# Patient Record
Sex: FEMALE | Race: WHITE | NOT HISPANIC OR LATINO | Employment: FULL TIME | URBAN - METROPOLITAN AREA
[De-identification: names, ages, dates, MRNs, and addresses within clinical notes are randomized per-mention and may not be internally consistent; named-entity substitution may affect disease eponyms.]

---
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
## 2023-10-21 ENCOUNTER — HOSPITAL ENCOUNTER (EMERGENCY)
Facility: HOSPITAL | Age: 18
Discharge: HOME | End: 2023-10-21
Attending: EMERGENCY MEDICINE
Payer: COMMERCIAL

## 2023-10-21 ENCOUNTER — APPOINTMENT (OUTPATIENT)
Dept: CT IMAGING | Facility: HOSPITAL | Age: 18
End: 2023-10-21
Payer: COMMERCIAL

## 2023-10-21 VITALS
HEIGHT: 63 IN | SYSTOLIC BLOOD PRESSURE: 115 MMHG | RESPIRATION RATE: 16 BRPM | HEART RATE: 90 BPM | BODY MASS INDEX: 33.66 KG/M2 | WEIGHT: 190 LBS | DIASTOLIC BLOOD PRESSURE: 67 MMHG | TEMPERATURE: 98.3 F | OXYGEN SATURATION: 98 %

## 2023-10-21 DIAGNOSIS — R10.84 GENERALIZED ABDOMINAL PAIN: Primary | ICD-10-CM

## 2023-10-21 DIAGNOSIS — N93.8 DYSFUNCTIONAL UTERINE BLEEDING: ICD-10-CM

## 2023-10-21 LAB
ALBUMIN SERPL-MCNC: 4 G/DL (ref 3.5–5.2)
ALBUMIN/GLOB SERPL: 1.3 G/DL
ALP SERPL-CCNC: 76 U/L (ref 39–117)
ALT SERPL W P-5'-P-CCNC: 12 U/L (ref 1–33)
ANION GAP SERPL CALCULATED.3IONS-SCNC: 10 MMOL/L (ref 5–15)
AST SERPL-CCNC: 19 U/L (ref 1–32)
B-HCG UR QL: NEGATIVE
BASOPHILS # BLD AUTO: 0.02 10*3/MM3 (ref 0–0.2)
BASOPHILS NFR BLD AUTO: 0.5 % (ref 0–1.5)
BILIRUB SERPL-MCNC: 0.2 MG/DL (ref 0–1.2)
BILIRUB UR QL STRIP: NEGATIVE
BUN SERPL-MCNC: 9 MG/DL (ref 6–20)
BUN/CREAT SERPL: 12.5 (ref 7–25)
CALCIUM SPEC-SCNC: 9.2 MG/DL (ref 8.6–10.5)
CHLORIDE SERPL-SCNC: 106 MMOL/L (ref 98–107)
CLARITY UR: CLEAR
CO2 SERPL-SCNC: 23 MMOL/L (ref 22–29)
COLOR UR: YELLOW
CREAT SERPL-MCNC: 0.72 MG/DL (ref 0.57–1)
DEPRECATED RDW RBC AUTO: 39.8 FL (ref 37–54)
EGFRCR SERPLBLD CKD-EPI 2021: 122.9 ML/MIN/1.73
EOSINOPHIL # BLD AUTO: 0.04 10*3/MM3 (ref 0–0.4)
EOSINOPHIL NFR BLD AUTO: 1 % (ref 0.3–6.2)
ERYTHROCYTE [DISTWIDTH] IN BLOOD BY AUTOMATED COUNT: 11.9 % (ref 12.3–15.4)
EXPIRATION DATE: NORMAL
GLOBULIN UR ELPH-MCNC: 3.1 GM/DL
GLUCOSE SERPL-MCNC: 94 MG/DL (ref 65–99)
GLUCOSE UR STRIP-MCNC: NEGATIVE MG/DL
HCT VFR BLD AUTO: 38.8 % (ref 34–46.6)
HGB BLD-MCNC: 13.1 G/DL (ref 12–15.9)
HGB UR QL STRIP.AUTO: NEGATIVE
IMM GRANULOCYTES # BLD AUTO: 0.01 10*3/MM3 (ref 0–0.05)
IMM GRANULOCYTES NFR BLD AUTO: 0.2 % (ref 0–0.5)
INTERNAL NEGATIVE CONTROL: NORMAL
INTERNAL POSITIVE CONTROL: NORMAL
KETONES UR QL STRIP: NEGATIVE
LEUKOCYTE ESTERASE UR QL STRIP.AUTO: NEGATIVE
LYMPHOCYTES # BLD AUTO: 1.36 10*3/MM3 (ref 0.7–3.1)
LYMPHOCYTES NFR BLD AUTO: 32.4 % (ref 19.6–45.3)
Lab: NORMAL
MCH RBC QN AUTO: 30.6 PG (ref 26.6–33)
MCHC RBC AUTO-ENTMCNC: 33.8 G/DL (ref 31.5–35.7)
MCV RBC AUTO: 90.7 FL (ref 79–97)
MONOCYTES # BLD AUTO: 0.84 10*3/MM3 (ref 0.1–0.9)
MONOCYTES NFR BLD AUTO: 20 % (ref 5–12)
NEUTROPHILS NFR BLD AUTO: 1.93 10*3/MM3 (ref 1.7–7)
NEUTROPHILS NFR BLD AUTO: 45.9 % (ref 42.7–76)
NITRITE UR QL STRIP: NEGATIVE
NRBC BLD AUTO-RTO: 0 /100 WBC (ref 0–0.2)
PH UR STRIP.AUTO: 5.5 [PH] (ref 5–8)
PLATELET # BLD AUTO: 255 10*3/MM3 (ref 140–450)
PMV BLD AUTO: 9.9 FL (ref 6–12)
POTASSIUM SERPL-SCNC: 4.1 MMOL/L (ref 3.5–5.2)
PROT SERPL-MCNC: 7.1 G/DL (ref 6–8.5)
PROT UR QL STRIP: NEGATIVE
RBC # BLD AUTO: 4.28 10*6/MM3 (ref 3.77–5.28)
SODIUM SERPL-SCNC: 139 MMOL/L (ref 136–145)
SP GR UR STRIP: 1.03 (ref 1–1.03)
UROBILINOGEN UR QL STRIP: NORMAL
WBC NRBC COR # BLD: 4.2 10*3/MM3 (ref 3.4–10.8)

## 2023-10-21 PROCEDURE — 99284 EMERGENCY DEPT VISIT MOD MDM: CPT

## 2023-10-21 PROCEDURE — 85025 COMPLETE CBC W/AUTO DIFF WBC: CPT | Performed by: EMERGENCY MEDICINE

## 2023-10-21 PROCEDURE — 81025 URINE PREGNANCY TEST: CPT | Performed by: EMERGENCY MEDICINE

## 2023-10-21 PROCEDURE — 25810000003 SODIUM CHLORIDE 0.9 % SOLUTION: Performed by: EMERGENCY MEDICINE

## 2023-10-21 PROCEDURE — 96374 THER/PROPH/DIAG INJ IV PUSH: CPT

## 2023-10-21 PROCEDURE — 25010000002 KETOROLAC TROMETHAMINE PER 15 MG: Performed by: EMERGENCY MEDICINE

## 2023-10-21 PROCEDURE — 74176 CT ABD & PELVIS W/O CONTRAST: CPT

## 2023-10-21 PROCEDURE — 81003 URINALYSIS AUTO W/O SCOPE: CPT | Performed by: EMERGENCY MEDICINE

## 2023-10-21 PROCEDURE — 80053 COMPREHEN METABOLIC PANEL: CPT | Performed by: EMERGENCY MEDICINE

## 2023-10-21 RX ORDER — IBUPROFEN 600 MG/1
600 TABLET ORAL EVERY 8 HOURS PRN
Qty: 21 TABLET | Refills: 0 | Status: SHIPPED | OUTPATIENT
Start: 2023-10-21

## 2023-10-21 RX ORDER — KETOROLAC TROMETHAMINE 30 MG/ML
30 INJECTION, SOLUTION INTRAMUSCULAR; INTRAVENOUS ONCE
Status: COMPLETED | OUTPATIENT
Start: 2023-10-21 | End: 2023-10-21

## 2023-10-21 RX ORDER — SODIUM CHLORIDE 0.9 % (FLUSH) 0.9 %
10 SYRINGE (ML) INJECTION AS NEEDED
Status: DISCONTINUED | OUTPATIENT
Start: 2023-10-21 | End: 2023-10-21 | Stop reason: HOSPADM

## 2023-10-21 RX ADMIN — KETOROLAC TROMETHAMINE 30 MG: 30 INJECTION, SOLUTION INTRAMUSCULAR; INTRAVENOUS at 03:59

## 2023-10-21 RX ADMIN — SODIUM CHLORIDE 1000 ML: 9 INJECTION, SOLUTION INTRAVENOUS at 03:59

## 2023-10-21 NOTE — ED PROVIDER NOTES
Subjective   History of Present Illness  This is a 18-year-old female presented to the emergency department with some lower abdominal cramping and vaginal bleeding.  Patient states back in July she was evaluated for displaced IUD.  She was post to follow-up with OB/GYN, however has not done that yet.  Patient states that she came on her normal menstrual cycle.  She is concerned because she is using more tampons than normal.  Her last menstrual cycle was a month ago and was normal.  Symptoms and cramping in the lower abdomen and back.  She denies any dysuria or hematuria.  No other vaginal discharge.  There is no fevers or chills.  No headache or change in vision.  No focal weakness.  No chest pain or shortness of breath.        Review of Systems   Constitutional:  Negative for chills and fever.   HENT:  Negative for congestion, ear pain and sore throat.    Eyes:  Negative for visual disturbance.   Respiratory:  Negative for shortness of breath.    Cardiovascular:  Negative for chest pain.   Gastrointestinal:  Positive for abdominal pain.   Genitourinary:  Positive for vaginal bleeding. Negative for difficulty urinating.   Musculoskeletal:  Negative for arthralgias.   Skin:  Negative for rash.   Neurological:  Negative for dizziness, weakness and numbness.   Psychiatric/Behavioral:  Negative for agitation.        Past Medical History:   Diagnosis Date    Migraine        No Known Allergies    No past surgical history on file.    No family history on file.    Social History     Socioeconomic History    Marital status: Single   Tobacco Use    Smoking status: Never   Vaping Use    Vaping Use: Some days   Substance and Sexual Activity    Alcohol use: Defer    Drug use: Defer    Sexual activity: Never           Objective   Physical Exam  Vitals and nursing note reviewed.   Constitutional:       General: She is not in acute distress.     Appearance: She is not ill-appearing or toxic-appearing.   HENT:      Mouth/Throat:       Pharynx: No posterior oropharyngeal erythema.   Eyes:      Conjunctiva/sclera: Conjunctivae normal.      Pupils: Pupils are equal, round, and reactive to light.   Cardiovascular:      Rate and Rhythm: Normal rate and regular rhythm.   Pulmonary:      Effort: Pulmonary effort is normal. No respiratory distress.   Abdominal:      General: Abdomen is flat. There is no distension.      Palpations: There is no mass.      Tenderness: There is no abdominal tenderness. There is no guarding or rebound.   Musculoskeletal:         General: No deformity. Normal range of motion.   Skin:     General: Skin is warm.      Findings: No rash.   Neurological:      General: No focal deficit present.      Mental Status: She is alert and oriented to person, place, and time.      Motor: No weakness.         Procedures           ED Course  ED Course as of 10/21/23 0527   Sat Oct 21, 2023   0423 BP: 115/67 [JK]   0423 Temp: 98.3 °F (36.8 °C) [JK]   0423 Temp src: Oral [JK]   0423 Heart Rate: 90 [JK]   0423 Resp: 16 [JK]   0423 SpO2: 98 %  Patient's repeat vitals, telemetry tracing, and pulse oximetry tracing were directly viewed and interpreted by myself.  Patient is hemodynamically stable [JK]   0524 Comprehensive Metabolic Panel [JK]   0524 Urinalysis With Microscopic If Indicated (No Culture) - Urine, Clean Catch [JK]   0524 POC Urine Pregnancy [JK]   0524 CBC & Differential(!)  Laboratory studies were reviewed and interpreted directly by myself.  CMP normal, urinalysis normal, pregnancy was negative, CBC normal [JK]   0525 CT Abdomen Pelvis Without Contrast  Imaging was directly visualized by myself, per my interpretations, CT of the abdomen and pelvis showed IUD in appropriate place.  No other process.. [JK]   0525 On reevaluation, the patient states that they are feeling much better.  Patient tolerated by mouth challenge of juice and crackers without difficulty.  They are not complaining of any new abdominal pain.  Repeat examination  did not show any significant guarding or rebound.  No evidence of peritonitis.  No acute no tenderness.  Patient was given strict return precautions for acute abdomen.  They need to be reevaluated within 24 hours for acute abdomen by PCP or return to the emergency department for reexamination.   [JK]   1191 I had a discussion with the patient/family regarding diagnosis, diagnostic results, treatment plan, and medications. The patient/family indicated understanding of these instructions. I spent adequate time at the bedside prior to discharge necessary to discuss the aftercare instructions, giving patient education, providing explanations of the results of our evaluations/findings, and my decision making to assure that the patient/family understand the plan of care. Time was allotted to answer questions at that time and throughout the ED course. Patient is required to maintain timely follow up, as discussed. I also discussed the potential for the development of an acute emergent condition requiring further evaluation, return to the ER, admission, or even surgical intervention.  I encouraged the patient to return to the emergency department immediately for any concerns, worsening symptoms, new complaints, or if symptoms persist and they are unable to seek follow-up in a timely fashion. The patient/family expressed understanding and agreement with this plan    Shared decision making:   After full review of the patient's clinical presentation, review of any work-up including but not limited to laboratory studies and radiology obtained, I had a discussion with the patient.  Treatment options were discussed as well as the risks, benefits and consequences.  I discussed all findings with the patient and family members if available.  During the discussion, treatment goals were understood by all as well as any misconceptions which were addressed with the patient.  Ample time was given for any questions they may have had.   They are in agreement with the treatment plan as well as final disposition. [JK]      ED Course User Index  [JK] Kvng Fenton MD                                           Medical Decision Making  This is a 18-year-old female presented to the emergency department with some lower abdominal cramping and vaginal bleeding.  The patient symptoms seem consistent with her menstrual cycle.  On examination she has no reproducible tenderness.  There is no evidence of acute peritonitis or acute abdomen.  Patient is hemodynamically stable.  Afebrile.  IV asked will be established.  Provided symptomatic treatment.  Work-up initiated.      Differential diagnosis: Peptic ulcer disease, dyspepsia, GERD, pancreatitis, biliary colic, electrolyte abnormality, acute kidney injury, dysfunctional uterine bleeding, menstrual cycle      Problems Addressed:  Dysfunctional uterine bleeding: complicated acute illness or injury  Generalized abdominal pain: complicated acute illness or injury    Amount and/or Complexity of Data Reviewed  External Data Reviewed: labs, radiology and notes.     Details: External laboratories, imaging as well as notes were reviewed personally by myself.  All relevant studies were used to guide decision making.     Date of previous record: 7/5/2023    Source of note:  emergency department    Summary: Patient was seen for pelvic pain and vaginal bleeding.  Did review laboratory studies on file as well as ultrasound of the pelvis showing the displaced IUD    Labs: ordered. Decision-making details documented in ED Course.  Radiology: ordered and independent interpretation performed. Decision-making details documented in ED Course.    Risk  Prescription drug management.        Final diagnoses:   Generalized abdominal pain   Dysfunctional uterine bleeding       ED Disposition  ED Disposition       ED Disposition   Discharge    Condition   Stable    Comment   --               Charmaine Mobley MD  [address]  [facility] [address]  [place] [id number]  [phone]    Call today           Medication List        New Prescriptions      ibuprofen 600 MG tablet  Commonly known as: ADVIL,MOTRIN  Take 1 tablet by mouth Every 8 (Eight) Hours As Needed for Mild Pain (for moderate severity pain).               Where to Get Your Medications        These medications were sent to [facility] [id number] - [place], [place] - [address] [place] [address] [address] & [address] [place] - [phone]  - [phone] [place] [address]      Phone: [phone]   ibuprofen 600 MG tablet            Kvng Fenton MD  10/21/23 7011

## 2023-12-26 ENCOUNTER — LAB (OUTPATIENT)
Dept: INTERNAL MEDICINE | Facility: CLINIC | Age: 18
End: 2023-12-26
Payer: COMMERCIAL

## 2023-12-26 ENCOUNTER — OFFICE VISIT (OUTPATIENT)
Dept: INTERNAL MEDICINE | Facility: CLINIC | Age: 18
End: 2023-12-26
Payer: COMMERCIAL

## 2023-12-26 ENCOUNTER — TELEPHONE (OUTPATIENT)
Dept: INTERNAL MEDICINE | Facility: CLINIC | Age: 18
End: 2023-12-26

## 2023-12-26 VITALS
OXYGEN SATURATION: 98 % | WEIGHT: 240.4 LBS | TEMPERATURE: 97.3 F | SYSTOLIC BLOOD PRESSURE: 102 MMHG | HEART RATE: 98 BPM | DIASTOLIC BLOOD PRESSURE: 68 MMHG | BODY MASS INDEX: 42.59 KG/M2 | HEIGHT: 63 IN

## 2023-12-26 DIAGNOSIS — Z00.00 ANNUAL PHYSICAL EXAM: ICD-10-CM

## 2023-12-26 DIAGNOSIS — F43.10 PTSD (POST-TRAUMATIC STRESS DISORDER): ICD-10-CM

## 2023-12-26 DIAGNOSIS — F60.3 BORDERLINE PERSONALITY DISORDER: Primary | ICD-10-CM

## 2023-12-26 DIAGNOSIS — Z72.0 TOBACCO USE: ICD-10-CM

## 2023-12-26 DIAGNOSIS — Z23 NEED FOR PNEUMOCOCCAL VACCINATION: ICD-10-CM

## 2023-12-26 DIAGNOSIS — M94.0 COSTOCHONDRITIS: ICD-10-CM

## 2023-12-26 LAB
ALBUMIN SERPL-MCNC: 4.1 G/DL (ref 3.5–5.2)
ALBUMIN/GLOB SERPL: 1.5 G/DL
ALP SERPL-CCNC: 70 U/L (ref 39–117)
ALT SERPL W P-5'-P-CCNC: 13 U/L (ref 1–33)
ANION GAP SERPL CALCULATED.3IONS-SCNC: 11.6 MMOL/L (ref 5–15)
AST SERPL-CCNC: 16 U/L (ref 1–32)
BILIRUB SERPL-MCNC: 0.2 MG/DL (ref 0–1.2)
BUN SERPL-MCNC: 11 MG/DL (ref 6–20)
BUN/CREAT SERPL: 13.3 (ref 7–25)
CALCIUM SPEC-SCNC: 9.1 MG/DL (ref 8.6–10.5)
CHLORIDE SERPL-SCNC: 105 MMOL/L (ref 98–107)
CHOLEST SERPL-MCNC: 223 MG/DL (ref 0–200)
CO2 SERPL-SCNC: 21.4 MMOL/L (ref 22–29)
CREAT SERPL-MCNC: 0.83 MG/DL (ref 0.57–1)
DEPRECATED RDW RBC AUTO: 40.3 FL (ref 37–54)
EGFRCR SERPLBLD CKD-EPI 2021: 103.6 ML/MIN/1.73
ERYTHROCYTE [DISTWIDTH] IN BLOOD BY AUTOMATED COUNT: 12.3 % (ref 12.3–15.4)
GLOBULIN UR ELPH-MCNC: 2.7 GM/DL
GLUCOSE SERPL-MCNC: 86 MG/DL (ref 65–99)
HBA1C MFR BLD: 5.2 % (ref 4.8–5.6)
HCT VFR BLD AUTO: 39.9 % (ref 34–46.6)
HDLC SERPL-MCNC: 40 MG/DL (ref 40–60)
HGB BLD-MCNC: 13.5 G/DL (ref 12–15.9)
LDLC SERPL CALC-MCNC: 166 MG/DL (ref 0–100)
LDLC/HDLC SERPL: 4.09 {RATIO}
MCH RBC QN AUTO: 30.8 PG (ref 26.6–33)
MCHC RBC AUTO-ENTMCNC: 33.8 G/DL (ref 31.5–35.7)
MCV RBC AUTO: 91.1 FL (ref 79–97)
PLATELET # BLD AUTO: 303 10*3/MM3 (ref 140–450)
PMV BLD AUTO: 10.1 FL (ref 6–12)
POTASSIUM SERPL-SCNC: 4.1 MMOL/L (ref 3.5–5.2)
PROT SERPL-MCNC: 6.8 G/DL (ref 6–8.5)
RBC # BLD AUTO: 4.38 10*6/MM3 (ref 3.77–5.28)
SODIUM SERPL-SCNC: 138 MMOL/L (ref 136–145)
TRIGL SERPL-MCNC: 97 MG/DL (ref 0–150)
TSH SERPL DL<=0.05 MIU/L-ACNC: 0.87 UIU/ML (ref 0.27–4.2)
VLDLC SERPL-MCNC: 17 MG/DL (ref 5–40)
WBC NRBC COR # BLD AUTO: 6.12 10*3/MM3 (ref 3.4–10.8)

## 2023-12-26 PROCEDURE — 80050 GENERAL HEALTH PANEL: CPT | Performed by: PHYSICIAN ASSISTANT

## 2023-12-26 PROCEDURE — 36415 COLL VENOUS BLD VENIPUNCTURE: CPT | Performed by: PHYSICIAN ASSISTANT

## 2023-12-26 PROCEDURE — 83036 HEMOGLOBIN GLYCOSYLATED A1C: CPT | Performed by: PHYSICIAN ASSISTANT

## 2023-12-26 PROCEDURE — 80061 LIPID PANEL: CPT | Performed by: PHYSICIAN ASSISTANT

## 2023-12-26 RX ORDER — TRAMADOL HYDROCHLORIDE 50 MG/1
TABLET ORAL
COMMUNITY
Start: 2023-12-15

## 2023-12-26 NOTE — TELEPHONE ENCOUNTER
Caller: [id number] - [address] AT [address] & [address] [facility] [phone] [facility] [phone] FX    Relationship: Pharmacy    Which medication are you concerned about: RX NICOTINE NEEDS FREQUENCY

## 2023-12-26 NOTE — PROGRESS NOTES
"[facility]  [address]  [facility] [address]  Dept: [phone]  Dept Fax: [phone]  Loc: [phone]  Loc Fax: [phone]    Renata Garcia  2005    New Patient Office Note    History of Present Illness:  Patient is a 18-year-old female in today to establish care for borderline personality disorder, PTSD, and costochondritis.  Patient recently in emergency room and was diagnosed with costochondritis.  Patient has been taking Ultram for this which has helped.  Patient needing work accommodations due to pain.    Patient with a history of borderline personality disorder PTSD needing referral to psychiatry and to see a therapist.  Patient depression screening flag concern for suicide.  Patient denies any thoughts of hurting herself or others.  Patient gives 3 reasons why she will not hurt herself: \"Because of my boyfriend, because I do not want to let my mother down, and because I want to build a house and a rescue shelter for dogs.\"    Patient does vape daily.  Would like to quit.  Agreeable to trying the gum.        The following portions of the patient's history were reviewed and updated as appropriate: allergies, current medications, past family history, past medical history, past social history, past surgical history, and problem list.    Medications:    Current Outpatient Medications:     albuterol sulfate  (90 Base) MCG/ACT inhaler, Inhale 2 puffs Every 6 (Six) Hours As Needed for Wheezing., Disp: 6.7 g, Rfl: 0    ibuprofen (ADVIL,MOTRIN) 600 MG tablet, Take 1 tablet by mouth Every 8 (Eight) Hours As Needed for Mild Pain (for moderate severity pain)., Disp: 21 tablet, Rfl: 0    traMADol (ULTRAM) 50 MG tablet, , Disp: , Rfl:     nicotine polacrilex (Nicorette) 4 MG gum, Chew 1 each As Needed for Smoking Cessation., Disp: 220 each, Rfl: 1    Subjective  No Known Allergies     Past Medical History:   Diagnosis Date    Asthma Baby    " Depression 11    I also have borderline personality disorder and ptsd    Eating disorder 14    Anorexia    GERD (gastroesophageal reflux disease) Baby?    Migraine     Obesity Always       Past Surgical History:   Procedure Laterality Date    TONSILLECTOMY  3 years okd    TUBAL ABDOMINAL LIGATION  14 months 2 years old       Family History   Problem Relation Age of Onset    Anxiety disorder Mother     Asthma Mother     Depression Mother     Mental illness Mother     Thyroid disease Mother     Depression Father     Depression Brother     Drug abuse Brother         Social History     Socioeconomic History    Marital status: Single   Tobacco Use    Smoking status: Every Day     Packs/day: 0.10     Years: 5.00     Additional pack years: 0.00     Total pack years: 0.50     Types: Cigarettes, Pipe     Start date: 2/1/2019     Last attempt to quit: 12/26/2023    Smokeless tobacco: Never    Tobacco comments:     I vape   Vaping Use    Vaping Use: Some days   Substance and Sexual Activity    Alcohol use: Yes     Alcohol/week: 2.0 standard drinks of alcohol     Types: 2 Shots of liquor per week     Comment: I drink like once a month    Drug use: Yes     Frequency: 7.0 times per week     Types: Marijuana    Sexual activity: Yes     Partners: Male     Birth control/protection: I.U.D.       Review of Systems   Constitutional:  Negative for activity change, chills, fatigue, fever and unexpected weight change.   HENT:  Negative for congestion, ear pain, postnasal drip, sinus pressure and sore throat.    Eyes:  Negative for pain, discharge and redness.   Respiratory:  Negative for cough, shortness of breath and wheezing.    Cardiovascular:  Negative for chest pain, palpitations and leg swelling.   Gastrointestinal:  Negative for diarrhea, nausea and vomiting.   Endocrine: Negative for cold intolerance and heat intolerance.   Genitourinary:  Negative for decreased urine volume and dysuria.   Musculoskeletal:  Negative for  "arthralgias and myalgias.   Skin:  Negative for rash and wound.   Neurological:  Negative for dizziness, light-headedness and headaches.   Hematological:  Does not bruise/bleed easily.   Psychiatric/Behavioral:  Positive for dysphoric mood. Negative for confusion, self-injury, sleep disturbance and suicidal ideas. The patient is nervous/anxious.        Objective  Vitals:    12/26/23 1013   BP: 102/68   BP Location: Left arm   Patient Position: Sitting   Cuff Size: Large Adult   Pulse: 98   Temp: 97.3 °F (36.3 °C)   TempSrc: Temporal   SpO2: 98%   Weight: 109 kg (240 lb 6.4 oz)   Height: 160 cm (62.99\")       Physical Exam  Physical Exam  Vitals and nursing note reviewed.   Constitutional:       General: She is not in acute distress.     Appearance: She is not ill-appearing.   HENT:      Head: Normocephalic.      Right Ear: Tympanic membrane, ear canal and external ear normal. There is no impacted cerumen.      Left Ear: Tympanic membrane, ear canal and external ear normal. There is no impacted cerumen.      Nose: No congestion or rhinorrhea.      Mouth/Throat:      Mouth: Mucous membranes are moist.      Pharynx: Oropharynx is clear. No oropharyngeal exudate or posterior oropharyngeal erythema.   Eyes:      General:         Right eye: No discharge.         Left eye: No discharge.      Extraocular Movements: Extraocular movements intact.      Conjunctiva/sclera: Conjunctivae normal.      Pupils: Pupils are equal, round, and reactive to light.   Cardiovascular:      Rate and Rhythm: Normal rate and regular rhythm.      Heart sounds: Normal heart sounds. No murmur heard.     No friction rub. No gallop.   Pulmonary:      Effort: Pulmonary effort is normal. No respiratory distress.      Breath sounds: Normal breath sounds. No wheezing.   Abdominal:      General: Bowel sounds are normal. There is no distension.      Palpations: Abdomen is soft. There is no mass.      Tenderness: There is no abdominal tenderness. "   Musculoskeletal:         General: No swelling. Normal range of motion.      Cervical back: Normal range of motion. No tenderness.      Right lower leg: No edema.      Left lower leg: No edema.   Lymphadenopathy:      Cervical: No cervical adenopathy.   Skin:     Findings: No bruising, erythema or rash.   Neurological:      Mental Status: She is oriented to person, place, and time.      Gait: Gait normal.   Psychiatric:         Mood and Affect: Mood normal.         Behavior: Behavior normal.         Thought Content: Thought content normal.         Judgment: Judgment normal.         Diagnostic Data  Procedures    Assessment  Diagnoses and all orders for this visit:    1. Borderline personality disorder (Primary)  -     Ambulatory Referral to Psychiatry  -     Ambulatory Referral to Psychology    2. PTSD (post-traumatic stress disorder)  -     Ambulatory Referral to Psychiatry  -     Ambulatory Referral to Psychology    3. Costochondritis    4. Annual physical exam  -     CBC (No Diff)  -     Comprehensive Metabolic Panel  -     TSH Rfx On Abnormal To Free T4  -     Hemoglobin A1c; Future  -     Lipid Panel    5. Need for pneumococcal vaccination    6. Tobacco use    Other orders  -     Pneumococcal Conjugate Vaccine 20-Valent (PCV20)  -     nicotine polacrilex (Nicorette) 4 MG gum; Chew 1 each As Needed for Smoking Cessation.  Dispense: 220 each; Refill: 1        Plan    1. Borderline personality disorder (Primary)- referred to psychiatry and psychology.    2. PTSD (post-traumatic stress disorder)- worse, referred to psychiatry and psychology.  Patient would like to hold off on taking any medications at this point until she sees specialty.  Advised if she develops any suicidal ideation to call 911 immediately. Patient verbalized understanding of all instructions given and complied.    3. Costochondritis- obtain CSA and UDS and Remigio.  On Ultram as directed as needed.  This has helped.    4. Annual physical exam-  obtain fasting labs and follow-up with these.  Advised on nutrition and exercise and follow-up with this.    5. Need for pneumococcal vaccination- administered PCV 20 in office today, patient tolerated well.    6. Tobacco use- Renata Garcia reports vaping.  Patient vapes daily with nicotine.  I have educated her on the risk of diseases from using tobacco products such as cancer, COPD, and heart disease.     I advised her to quit and she is willing to quit. We have discussed the following method/s for tobacco cessation:  Prescription Medicaiton.  Prescribed Nicorette gum.  Advised not to smoke while using the gum.  Together we have set a quit date for  today .  She will follow up with me in 4 weeks or sooner to check on her progress.    I spent >10 minutes counseling the patient.      Return in about 4 weeks (around 1/23/2024) for Recheck.    Larry Arellano PA-C  12/26/2023

## 2023-12-27 ENCOUNTER — PATIENT MESSAGE (OUTPATIENT)
Dept: INTERNAL MEDICINE | Facility: CLINIC | Age: 18
End: 2023-12-27
Payer: COMMERCIAL

## 2023-12-27 ENCOUNTER — OFFICE VISIT (OUTPATIENT)
Dept: OBSTETRICS AND GYNECOLOGY | Facility: CLINIC | Age: 18
End: 2023-12-27
Payer: COMMERCIAL

## 2023-12-27 ENCOUNTER — TELEPHONE (OUTPATIENT)
Dept: INTERNAL MEDICINE | Facility: CLINIC | Age: 18
End: 2023-12-27
Payer: COMMERCIAL

## 2023-12-27 VITALS
BODY MASS INDEX: 42.52 KG/M2 | WEIGHT: 240 LBS | HEIGHT: 63 IN | DIASTOLIC BLOOD PRESSURE: 70 MMHG | SYSTOLIC BLOOD PRESSURE: 128 MMHG

## 2023-12-27 DIAGNOSIS — Z30.432 ENCOUNTER FOR IUD REMOVAL: ICD-10-CM

## 2023-12-27 DIAGNOSIS — Z01.419 ENCOUNTER FOR ANNUAL ROUTINE GYNECOLOGICAL EXAMINATION: Primary | ICD-10-CM

## 2023-12-27 DIAGNOSIS — E28.2 PCOS (POLYCYSTIC OVARIAN SYNDROME): ICD-10-CM

## 2023-12-27 NOTE — TELEPHONE ENCOUNTER
----- Message from Larry Arellano PA-C sent at 12/27/2023  8:47 AM EST -----  Bad cholesterol slightly elevated, please advise to cut back on high fat foods and foods such as red meats, butter, eggs, etc.  Otherwise normal labs.  Thank you.

## 2023-12-27 NOTE — ASSESSMENT & PLAN NOTE
Explained rationale for endometrial protection when not trying to conceive. Encouraged 5-10 lb weight gain to try and improve frequency of ovulation.

## 2023-12-27 NOTE — PROGRESS NOTES
Gynecologic Annual Exam Note        Annual Exam and IUD Removal        Subjective     HPI  Renata Garcia is a 18 y.o.  female who presents for annual well woman exam as a new patient.. Patient reports problems with:  pelvic pain . Her periods occur every 25-35 days , lasting 6 days. Since her IUD was inserted, the flow is typically heavy saturating a pad/tampon every 1.5 hours. This heavy bleeding lasts for 6 days of her period... She reports dysmenorrhea is moderate, occurring premenstrually and first 1-2 days of flow. Pt reports she had a Mirena inserted in 2020 for amenorrhea secondary to PCOS. Reports the IUD did induce a period but made them very heavy until just recently. Reports for at least 2 cycles she has only had spotting. Also reports pelvic pain that started in 2023. She went to  ED and TVUS showed her IUD was displaced. She did not follow up with  and would like her IUD removed today.    Partner Status: Marital Status: single.  She is sexually active. She has not had new partners.. STD testing recommendations have been explained to the patient and she does not desire STD testing.    Additional OB/GYN History   Current contraception: contraceptive methods: IUD.  Insertion date: 2020  Desires to: stop contraception  Thromboembolic Disease: none      History of STD: no    Last Pap : never  Last Completed Pap Smear       This patient has no relevant Health Maintenance data.             History of abnormal Pap smear: no  Gardasil status:uncertain if she received the vaccine  Family history of uterine, colon, breast, or ovarian cancer: no  Performs monthly Self-Breast Exam: no  Exercises Regularly:yes  Feelings of Anxiety or Depression: yes - well managed, seeing a therapist and psych  Tobacco Usage?: Yes Renata Garcia  reports that she has been smoking cigarettes and pipe. She started smoking about 4 years ago. She has a 0.50 pack-year smoking history. She has never used smokeless  tobacco.. I have educated her on the risk of diseases from using tobacco products such as cancer, COPD, heart disease, reproductive problems, and low birth weight.     I advised her to quit and she is willing to quit. We have discussed the following method/s for tobacco cessation:  Education Material Counseling [place] OTC Cessation Products.  T     I spent 3  minutes counseling the patient.            Current Outpatient Medications:     albuterol sulfate  (90 Base) MCG/ACT inhaler, Inhale 2 puffs Every 6 (Six) Hours As Needed for Wheezing., Disp: 6.7 g, Rfl: 0    ibuprofen (ADVIL,MOTRIN) 600 MG tablet, Take 1 tablet by mouth Every 8 (Eight) Hours As Needed for Mild Pain (for moderate severity pain)., Disp: 21 tablet, Rfl: 0    nicotine polacrilex (Nicorette) 4 MG gum, Use every 2 hours as needed, Disp: 220 each, Rfl: 1    traMADol (ULTRAM) 50 MG tablet, , Disp: , Rfl:      Patient denies the need for medication refills today.    OB History          3    Para   0    Term   0       0    AB   3    Living   0         SAB   0    IAB   0    Ectopic   0    Molar   0    Multiple   0    Live Births   0                Health Maintenance   Topic Date Due    HPV VACCINES (1 - 2-dose series) Never done    COVID-19 Vaccine (2023- season) 2024 (Originally 2023)    INFLUENZA VACCINE  2024 (Originally 2023)    CHLAMYDIA SCREENING  2024    ANNUAL PHYSICAL  2024    BMI FOLLOWUP  2024    TDAP/TD VACCINES (3 - Td or Tdap) 02/10/2029    HEPATITIS C SCREENING  Completed    Pneumococcal Vaccine 0-64  Completed    MENINGOCOCCAL VACCINE  Aged Out       Past Medical History:   Diagnosis Date    Anxiety 10    Asthma Baby    Depression 11    I also have borderline personality disorder and ptsd    Eating disorder 14    Anorexia    GERD (gastroesophageal reflux disease) Baby?    Migraine     Obesity Always    Ovarian cyst     On an xray somewhere    Recurrent pregnancy  "loss, antepartum condition or complication 2022    Kathi had 3        Past Surgical History:   Procedure Laterality Date    TONSILLECTOMY  5 years okd       The additional following portions of the patient's history were reviewed and updated as appropriate: allergies, current medications, past family history, past medical history, past social history, past surgical history, and problem list.    Review of Systems   Constitutional: Negative.    HENT: Negative.     Eyes: Negative.    Respiratory: Negative.     Cardiovascular: Negative.    Gastrointestinal: Negative.    Endocrine: Negative.    Genitourinary: Negative.    Musculoskeletal: Negative.    Skin: Negative.    Allergic/Immunologic: Negative.    Neurological: Negative.    Hematological: Negative.    Psychiatric/Behavioral: Negative.           I have reviewed and agree with the HPI, ROS, and historical information as entered above. Jl Valdez MD         Objective   /70   Ht 160 cm (62.99\")   Wt 109 kg (240 lb)   LMP 11/27/2023   BMI 42.53 kg/m²     Physical Exam  Vitals and nursing note reviewed. Exam conducted with a chaperone present.   Constitutional:       General: She is not in acute distress.     Appearance: Normal appearance. She is obese.   HENT:      Head: Normocephalic and atraumatic.   Pulmonary:      Effort: Pulmonary effort is normal.   Chest:   Breasts:     Right: Normal. No mass, nipple discharge or skin change.      Left: Normal. No mass, nipple discharge or skin change.   Abdominal:      General: There is no distension.      Palpations: Abdomen is soft. There is no mass.      Tenderness: There is no abdominal tenderness.   Genitourinary:     Exam position: Lithotomy position.      Labia:         Right: No lesion.         Left: No lesion.       Vagina: Normal. No vaginal discharge.      Cervix: Normal. No cervical motion tenderness or lesion.      Uterus: Normal.       Adnexa: Right adnexa normal and left adnexa normal.        Right: " No tenderness or fullness.          Left: No tenderness or fullness.        Comments: IUD visualized and removed, see separate procedure note  Musculoskeletal:         General: Normal range of motion.   Lymphadenopathy:      Upper Body:      Right upper body: No axillary adenopathy.      Left upper body: No axillary adenopathy.   Neurological:      General: No focal deficit present.      Mental Status: She is alert.            Assessment and Plan    Problem List Items Addressed This Visit       PCOS (polycystic ovarian syndrome)    Current Assessment & Plan     Explained rationale for endometrial protection when not trying to conceive. Encouraged 5-10 lb weight gain to try and improve frequency of ovulation.           Other Visit Diagnoses       Encounter for annual routine gynecological examination    -  Primary    Encounter for IUD removal                GYN annual well woman exam.   Reviewed pap guidelines.   Encouraged use of condoms for STD prevention.  Reviewed monthly self breast exams.  Instructed to call with lumps, pain, or breast discharge.    Reviewed BMI and weight loss as preventative health measures.   Reviewed exercise as a preventative health measures.   Smoking cessation encouraged.  Resources reviewed. (See above for full cessation details).  Preconceptual Counseling - Discussed cystic fibrosis screening, rubella screening, chicken pox immunity, folic acid supplementation and HIV screening.   Reccommended Flu Vaccine in Fall of each year.  RTC in 1 year or PRN with problems  Return in about 1 year (around 12/27/2024) for Annual exam.    Jl Valdez MD  12/27/2023

## 2023-12-27 NOTE — PROCEDURES
"     Gynecologic Procedure Note        Procedure: IUD Removal     IUD Removal    Date/Time: 12/27/2023 2:32 PM    Performed by: Jl Valdez MD  Authorized by: Jl Valdez MD  Preparation: Patient was prepped and draped in the usual sterile fashion.  Patient tolerance: patient tolerated the procedure well with no immediate complications          Pre procedure indication     1) Desires Removal of IUD    Post procedure indication   1) Desires Removal of IUD 2) Suspected embedded IUD    /70   Ht 160 cm (62.99\")   Wt 109 kg (240 lb)   LMP 11/27/2023   BMI 42.53 kg/m²       The patient presents today for removal of her IUD.  She requests removal of her IUD due to  displacement . She plans to use no method for contraception. She is trying to conceive. All her questions were answered and consents were signed.    Time out: immediate members of the procedure team and patient agree to the following: correct patient, correct site, correct procedure to be performed. Jl Valdez MD       The patient was placed in a dorsal lithotomy position on the examining table in [facility].  A speculum was inserted into the vagina and the cervix was brought into view.  An IUD string was visualized.  The string was then grasped, but with gentle traction the IUD was unable to be removed. A second attempt was made and the IUD removed intact with gentle traction.  There was a Moderate amount of bleeding and cramping. I suspect the IUD was partially embedded.     All  instruments were removed from the vagina.       The patient tolerated the procedure well with a mild amount of discomfort.  S     There were no complications.    The patient was counseled on other options for birth control. She plans to use no method for contraception.     Problem List Items Addressed This Visit       PCOS (polycystic ovarian syndrome)    Current Assessment & Plan     Explained rationale for endometrial protection when not trying to conceive. " Encouraged 5-10 lb weight gain to try and improve frequency of ovulation.           Other Visit Diagnoses       Encounter for annual routine gynecological examination    -  Primary    Encounter for IUD removal                  Jl Valdez MD  12/27/2023

## 2023-12-28 ENCOUNTER — OFFICE VISIT (OUTPATIENT)
Age: 18
End: 2023-12-28
Payer: COMMERCIAL

## 2023-12-28 ENCOUNTER — PATIENT ROUNDING (BHMG ONLY) (OUTPATIENT)
Dept: INTERNAL MEDICINE | Facility: CLINIC | Age: 18
End: 2023-12-28
Payer: COMMERCIAL

## 2023-12-28 VITALS
DIASTOLIC BLOOD PRESSURE: 78 MMHG | HEIGHT: 63 IN | OXYGEN SATURATION: 98 % | SYSTOLIC BLOOD PRESSURE: 122 MMHG | WEIGHT: 240.2 LBS | HEART RATE: 79 BPM | BODY MASS INDEX: 42.56 KG/M2

## 2023-12-28 DIAGNOSIS — F41.1 GENERALIZED ANXIETY DISORDER: ICD-10-CM

## 2023-12-28 DIAGNOSIS — Z51.81 ENCOUNTER FOR THERAPEUTIC DRUG MONITORING: Primary | ICD-10-CM

## 2023-12-28 DIAGNOSIS — F33.1 MAJOR DEPRESSIVE DISORDER, RECURRENT EPISODE, MODERATE DEGREE: ICD-10-CM

## 2023-12-28 DIAGNOSIS — F43.10 POST TRAUMATIC STRESS DISORDER (PTSD): ICD-10-CM

## 2023-12-28 RX ORDER — LAMOTRIGINE 25 MG/1
50 TABLET ORAL DAILY
Qty: 60 TABLET | Refills: 0 | Status: SHIPPED | OUTPATIENT
Start: 2023-12-28

## 2023-12-28 RX ORDER — HYDROXYZINE PAMOATE 25 MG/1
25 CAPSULE ORAL 2 TIMES DAILY PRN
Qty: 60 CAPSULE | Refills: 0 | Status: SHIPPED | OUTPATIENT
Start: 2023-12-28

## 2023-12-28 NOTE — PROGRESS NOTES
A  my chart message has been sent to the patient for patient rounding with [facility] [place].
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
## 2024-01-01 LAB
1OH-MIDAZOLAM UR QL SCN: NOT DETECTED NG/MG CREAT
6MAM UR QL SCN: NEGATIVE NG/ML
7AMINOCLONAZEPAM/CREAT UR: NOT DETECTED NG/MG CREAT
A-OH ALPRAZ/CREAT UR: NOT DETECTED NG/MG CREAT
A-OH-TRIAZOLAM/CREAT UR CFM: NOT DETECTED NG/MG CREAT
ACP UR QL CFM: NOT DETECTED
ALPRAZ/CREAT UR CFM: NOT DETECTED NG/MG CREAT
AMPHETAMINES UR QL SCN: NEGATIVE NG/ML
APAP UR QL SCN: NEGATIVE UG/ML
BARBITURATES UR QL SCN: NEGATIVE NG/ML
BENZODIAZ SCN METH UR: NEGATIVE
BUPRENORPHINE UR QL SCN: NEGATIVE
BUPRENORPHINE/CREAT UR: NOT DETECTED NG/MG CREAT
CANNABINOIDS UR QL CFM: NORMAL
CANNABINOIDS UR QL SCN: NORMAL NG/ML
CARBOXYTHC UR CFM-MCNC: 638 NG/MG CREAT
CARISOPRODOL UR QL: NEGATIVE NG/ML
CLONAZEPAM/CREAT UR CFM: NOT DETECTED NG/MG CREAT
COCAINE+BZE UR QL SCN: NEGATIVE NG/ML
CREAT UR-MCNC: 96 MG/DL
D-METHORPHAN UR-MCNC: NOT DETECTED NG/ML
D-METHORPHAN+LEVORPHANOL UR QL: NOT DETECTED
DESALKYLFLURAZ/CREAT UR: NOT DETECTED NG/MG CREAT
DIAZEPAM/CREAT UR: NOT DETECTED NG/MG CREAT
ETG ETS UR QL CFM: NORMAL
ETHANOL UR QL SCN: NEGATIVE G/DL
ETHANOL UR QL SCN: NORMAL NG/ML
ETHYL GLUCURONIDE UR CFM-MCNC: 2658 NG/MG CREAT
ETHYL SULFATE UR CFM-MCNC: 271 NG/MG CREAT
FENTANYL CTO UR SCN-MCNC: NEGATIVE NG/ML
FENTANYL/CREAT UR: NOT DETECTED NG/MG CREAT
FLUNITRAZEPAM UR QL SCN: NOT DETECTED NG/MG CREAT
GABAPENTIN UR-MCNC: NEGATIVE UG/ML
HYPNOTICS UR QL SCN: NEGATIVE
KETAMINE UR QL: NOT DETECTED
LORAZEPAM/CREAT UR: NOT DETECTED NG/MG CREAT
MEPERIDINE UR QL SCN: NEGATIVE NG/ML
METHADONE UR QL SCN: NEGATIVE NG/ML
METHADONE+METAB UR QL SCN: NEGATIVE NG/ML
MIDAZOLAM/CREAT UR CFM: NOT DETECTED NG/MG CREAT
MISCELLANEOUS, UR: NEGATIVE
NORBUPRENORPHINE/CREAT UR: NOT DETECTED NG/MG CREAT
NORDIAZEPAM/CREAT UR: NOT DETECTED NG/MG CREAT
NORFENTANYL/CREAT UR: NOT DETECTED NG/MG CREAT
NORFLUNITRAZEPAM UR-MCNC: NOT DETECTED NG/MG CREAT
NORKETAMINE UR-MCNC: NOT DETECTED UG/ML
OPIATES UR SCN-MCNC: NEGATIVE NG/ML
OTHER HALLUCINOGENS UR: NEGATIVE
OXAZEPAM/CREAT UR: NOT DETECTED NG/MG CREAT
OXYCODONE CTO UR SCN-MCNC: NEGATIVE NG/ML
PCP UR QL SCN: NEGATIVE NG/ML
PRESCRIBED MEDICATIONS: NORMAL
PRESCRIBED MEDICATIONS: NORMAL
PROPOXYPH UR QL SCN: NEGATIVE NG/ML
TAPENTADOL CTO UR SCN-MCNC: NEGATIVE NG/ML
TEMAZEPAM/CREAT UR: NOT DETECTED NG/MG CREAT
TRAMADOL UR QL SCN: NEGATIVE NG/ML
ZALEPLON UR-MCNC: NOT DETECTED NG/ML
ZOLPIDEM PHENYL-4-CARB UR QL SCN: NOT DETECTED
ZOLPIDEM UR QL SCN: NOT DETECTED
ZOPICLONE-N-OXIDE UR-MCNC: NOT DETECTED NG/ML

## 2024-01-03 ENCOUNTER — TELEPHONE (OUTPATIENT)
Dept: INTERNAL MEDICINE | Facility: CLINIC | Age: 19
End: 2024-01-03
Payer: COMMERCIAL

## 2024-01-03 NOTE — TELEPHONE ENCOUNTER
Pt called the office and stated she had a missed call from the office regarding paper work for her accommodations I didn't see any notes in the pts chart, please advise

## 2024-01-23 ENCOUNTER — OFFICE VISIT (OUTPATIENT)
Dept: INTERNAL MEDICINE | Facility: CLINIC | Age: 19
End: 2024-01-23
Payer: COMMERCIAL

## 2024-01-23 VITALS
BODY MASS INDEX: 42.74 KG/M2 | DIASTOLIC BLOOD PRESSURE: 76 MMHG | HEART RATE: 78 BPM | OXYGEN SATURATION: 98 % | SYSTOLIC BLOOD PRESSURE: 128 MMHG | WEIGHT: 241.2 LBS | TEMPERATURE: 97.8 F | HEIGHT: 63 IN

## 2024-01-23 DIAGNOSIS — J45.20 MILD INTERMITTENT ASTHMA WITHOUT COMPLICATION: ICD-10-CM

## 2024-01-23 DIAGNOSIS — F43.10 PTSD (POST-TRAUMATIC STRESS DISORDER): ICD-10-CM

## 2024-01-23 DIAGNOSIS — F60.3 BORDERLINE PERSONALITY DISORDER: ICD-10-CM

## 2024-01-23 DIAGNOSIS — M25.531 RIGHT WRIST PAIN: Primary | ICD-10-CM

## 2024-01-23 DIAGNOSIS — E78.5 HYPERLIPIDEMIA, UNSPECIFIED HYPERLIPIDEMIA TYPE: ICD-10-CM

## 2024-01-23 PROCEDURE — 1159F MED LIST DOCD IN RCRD: CPT | Performed by: PHYSICIAN ASSISTANT

## 2024-01-23 PROCEDURE — 99214 OFFICE O/P EST MOD 30 MIN: CPT | Performed by: PHYSICIAN ASSISTANT

## 2024-01-23 PROCEDURE — 1160F RVW MEDS BY RX/DR IN RCRD: CPT | Performed by: PHYSICIAN ASSISTANT

## 2024-01-23 RX ORDER — ALBUTEROL SULFATE 90 UG/1
2 AEROSOL, METERED RESPIRATORY (INHALATION) EVERY 6 HOURS PRN
Qty: 6.7 G | Refills: 0 | Status: SHIPPED | OUTPATIENT
Start: 2024-01-23

## 2024-01-23 NOTE — PROGRESS NOTES
[facility] PRIMARY CARE  [address]  [place] [address]  Dept: [phone]  Dept Fax: [phone]  Loc: [phone]  Loc Fax: [phone]    Renata Garcia  2005    Follow Up Office Visit Note    History of Present Illness:  Patient is a 18-year-old female in today for follow-up for right wrist pain, PTSD, borderline personality disorder, hyperlipidemia, and asthma.  Requesting refill of albuterol inhaler.  Symptoms well-controlled with this.  Works as a  in a tire shop and breeze and particles which irritate her lungs.    Due to see a therapist soon for BPD and PTSD.  Patient has seen psychiatry.  Was prescribed medication.  Overall stable.    Patient continues to struggle with right wrist pain.  Uses her right hand and arm quite a bit.  Family history of carpal tunnel with her mother.  Concerned this might be what she is experiencing.  Has numbness and tingling in this hand and wrist as well.    Wrist Pain   Pertinent negatives include no fever.       The following portions of the patient's history were reviewed and updated as appropriate: allergies, current medications, past family history, past medical history, past social history, past surgical history, and problem list.    Medications:    Current Outpatient Medications:     albuterol sulfate  (90 Base) MCG/ACT inhaler, Inhale 2 puffs Every 6 (Six) Hours As Needed for Wheezing., Disp: 6.7 g, Rfl: 0    hydrOXYzine pamoate (Vistaril) 25 MG capsule, Take 1 capsule by mouth 2 (Two) Times a Day As Needed for Itching., Disp: 60 capsule, Rfl: 0    ibuprofen (ADVIL,MOTRIN) 600 MG tablet, Take 1 tablet by mouth Every 8 (Eight) Hours As Needed for Mild Pain (for moderate severity pain)., Disp: 21 tablet, Rfl: 0    lamoTRIgine (LaMICtal) 25 MG tablet, Take 2 tablets by mouth Daily. 1 tablet by mouth daily for two weeks then increase to 2 tablets by mouth daily, Disp: 60 tablet, Rfl: 0    nicotine  polacrilex (Nicorette) 4 MG gum, Use every 2 hours as needed, Disp: 220 each, Rfl: 1    traMADol (ULTRAM) 50 MG tablet, , Disp: , Rfl:     Subjective  No Known Allergies     Past Medical History:   Diagnosis Date    Anxiety 10    Asthma Baby    Depression 11    I also have borderline personality disorder and ptsd    Eating disorder 14    Anorexia    GERD (gastroesophageal reflux disease) Baby?    Migraine     Obesity Always    Ovarian cyst     On an xray somewhere    Recurrent pregnancy loss, antepartum condition or complication     Kathi had 3       Past Surgical History:   Procedure Laterality Date    TONSILLECTOMY  5 years okd       Family History   Problem Relation Age of Onset    Anxiety disorder Mother     Asthma Mother     Depression Mother     Mental illness Mother     Thyroid disease Mother     Depression Father     Depression Brother     Drug abuse Brother         Social History     Socioeconomic History    Marital status: Single   Tobacco Use    Smoking status: Former     Packs/day: 0.10     Years: 5.00     Additional pack years: 0.00     Total pack years: 0.50     Types: Cigarettes, Pipe     Start date: 2019     Quit date: 2023     Years since quittin.0    Smokeless tobacco: Never    Tobacco comments:     I vape   Vaping Use    Vaping Use: Some days    Substances: Nicotine, Flavoring    Devices: Disposable   Substance and Sexual Activity    Alcohol use: Yes     Alcohol/week: 2.0 standard drinks of alcohol     Types: 2 Shots of liquor per week     Comment: I drink like once a month    Drug use: Yes     Frequency: 7.0 times per week     Types: Marijuana    Sexual activity: Yes     Partners: Male     Birth control/protection: I.U.D.     Comment: My iud is out of place       Review of Systems   Constitutional:  Negative for activity change, chills, fatigue, fever and unexpected weight change.   HENT:  Negative for congestion, ear pain, postnasal drip, sinus pressure and sore throat.   "  Eyes:  Negative for pain, discharge and redness.   Respiratory:  Negative for cough, shortness of breath and wheezing.    Cardiovascular:  Negative for chest pain, palpitations and leg swelling.   Gastrointestinal:  Negative for diarrhea, nausea and vomiting.   Endocrine: Negative for cold intolerance and heat intolerance.   Genitourinary:  Negative for decreased urine volume and dysuria.   Musculoskeletal:  Positive for arthralgias. Negative for myalgias.   Skin:  Negative for rash and wound.   Neurological:  Negative for dizziness, light-headedness and headaches.   Hematological:  Does not bruise/bleed easily.   Psychiatric/Behavioral:  Negative for confusion, dysphoric mood and sleep disturbance. The patient is not nervous/anxious.          Objective  Vitals:    01/23/24 1415   BP: 128/76   BP Location: Right arm   Patient Position: Sitting   Cuff Size: Large Adult   Pulse: 78   Temp: 97.8 °F (36.6 °C)   TempSrc: Temporal   SpO2: 98%   Weight: 109 kg (241 lb 3.2 oz)   Height: 160 cm (62.99\")     Body mass index is 42.74 kg/m².     Physical Exam  Physical Exam  Vitals and nursing note reviewed.   Constitutional:       General: She is not in acute distress.     Appearance: She is not ill-appearing.   HENT:      Head: Normocephalic.      Right Ear: Tympanic membrane, ear canal and external ear normal. There is no impacted cerumen.      Left Ear: Tympanic membrane, ear canal and external ear normal. There is no impacted cerumen.      Nose: No congestion or rhinorrhea.      Mouth/Throat:      Mouth: Mucous membranes are moist.      Pharynx: Oropharynx is clear. No oropharyngeal exudate or posterior oropharyngeal erythema.   Eyes:      General:         Right eye: No discharge.         Left eye: No discharge.      Extraocular Movements: Extraocular movements intact.      Conjunctiva/sclera: Conjunctivae normal.      Pupils: Pupils are equal, round, and reactive to light.   Cardiovascular:      Rate and Rhythm: Normal " rate and regular rhythm.      Heart sounds: Normal heart sounds. No murmur heard.     No friction rub. No gallop.   Pulmonary:      Effort: Pulmonary effort is normal. No respiratory distress.      Breath sounds: Normal breath sounds. No wheezing.   Abdominal:      General: Bowel sounds are normal. There is no distension.      Palpations: Abdomen is soft. There is no mass.      Tenderness: There is no abdominal tenderness.   Musculoskeletal:         General: Tenderness present. No swelling. Normal range of motion.      Cervical back: Normal range of motion. No tenderness.      Right lower leg: No edema.      Left lower leg: No edema.      Comments: Positive Phalen and Tinel sign on exam.   Lymphadenopathy:      Cervical: No cervical adenopathy.   Skin:     Findings: No bruising, erythema or rash.   Neurological:      Mental Status: She is oriented to person, place, and time.      Gait: Gait normal.   Psychiatric:         Mood and Affect: Mood normal.         Behavior: Behavior normal.         Thought Content: Thought content normal.         Judgment: Judgment normal.         Diagnostic Data  Procedures    Assessment  Diagnoses and all orders for this visit:    1. Right wrist pain (Primary)  -     EMG & Nerve Conduction Test; Future    2. Borderline personality disorder    3. PTSD (post-traumatic stress disorder)    4. Mild intermittent asthma without complication    5. Hyperlipidemia, unspecified hyperlipidemia type    Other orders  -     albuterol sulfate  (90 Base) MCG/ACT inhaler; Inhale 2 puffs Every 6 (Six) Hours As Needed for Wheezing.  Dispense: 6.7 g; Refill: 0        Plan    1. Right wrist pain (Primary)- worse, obtain nerve conduction studies.    2. Borderline personality disorder- overall seems to be doing okay.  Follow-up with psychiatry.  Due to see therapist soon.    3. PTSD (post-traumatic stress disorder)- overall seems to be doing okay.  Follow-up with psychiatry.  Due to see therapist  soon.    4. Mild intermittent asthma without complication- refilled albuterol inhaler.    5. Hyperlipidemia, unspecified hyperlipidemia type- advised on diet and exercise.      Return in about 3 months (around 4/23/2024) for Recheck.    Larry Arellano PA-C  01/23/2024  Answers submitted by the patient for this visit:  Other (Submitted on 1/23/2024)  Please describe your symptoms.: Check in/ wrist issues  Have you had these symptoms before?: Yes  How long have you been having these symptoms?: 5-7 days  Please list any medications you are currently taking for this condition.: None  Please describe any probable cause for these symptoms. : Carpal tunnel  Primary Reason for Visit (Submitted on 1/23/2024)  What is the primary reason for your visit?: Other

## 2024-03-20 ENCOUNTER — HOSPITAL ENCOUNTER (OUTPATIENT)
Dept: NEUROLOGY | Facility: HOSPITAL | Age: 19
Discharge: HOME | End: 2024-03-20
Admitting: PHYSICIAN ASSISTANT
Payer: COMMERCIAL

## 2024-03-20 DIAGNOSIS — M25.531 RIGHT WRIST PAIN: ICD-10-CM

## 2024-03-20 PROCEDURE — 95886 MUSC TEST DONE W/N TEST COMP: CPT

## 2024-03-20 PROCEDURE — 95909 NRV CNDJ TST 5-6 STUDIES: CPT

## 2024-03-25 ENCOUNTER — OFFICE VISIT (OUTPATIENT)
Age: 19
End: 2024-03-25

## 2024-03-25 VITALS
DIASTOLIC BLOOD PRESSURE: 72 MMHG | WEIGHT: 240.3 LBS | SYSTOLIC BLOOD PRESSURE: 116 MMHG | BODY MASS INDEX: 41.03 KG/M2 | HEIGHT: 64 IN

## 2024-03-25 DIAGNOSIS — M79.644 FINGER PAIN, RIGHT: ICD-10-CM

## 2024-03-25 DIAGNOSIS — G56.03 CARPAL TUNNEL SYNDROME, BILATERAL: Primary | ICD-10-CM

## 2024-03-25 RX ORDER — LIDOCAINE HYDROCHLORIDE 10 MG/ML
0.5 INJECTION, SOLUTION EPIDURAL; INFILTRATION; INTRACAUDAL; PERINEURAL
Status: COMPLETED | OUTPATIENT
Start: 2024-03-25 | End: 2024-03-25

## 2024-03-25 RX ORDER — TRIAMCINOLONE ACETONIDE 40 MG/ML
20 INJECTION, SUSPENSION INTRA-ARTICULAR; INTRAMUSCULAR
Status: COMPLETED | OUTPATIENT
Start: 2024-03-25 | End: 2024-03-25

## 2024-03-25 RX ADMIN — TRIAMCINOLONE ACETONIDE 20 MG: 40 INJECTION, SUSPENSION INTRA-ARTICULAR; INTRAMUSCULAR at 09:51

## 2024-03-25 RX ADMIN — LIDOCAINE HYDROCHLORIDE 0.5 ML: 10 INJECTION, SOLUTION EPIDURAL; INFILTRATION; INTRACAUDAL; PERINEURAL at 09:51

## 2024-03-25 NOTE — PROGRESS NOTES
[facility]     Office Visit       Date: 03/25/2024   Patient Name: Renata Garcia  MRN: [id number]  YOB: 2005    Referring Physician: No ref. provider found     Chief Complaint:   Chief Complaint   Patient presents with    Left Hand - Initial Evaluation    Right Hand - Initial Evaluation       History of Present Illness:   Renata Garcia is a 18 y.o. female right-hand-dominant presents with multiple complaints regarding her bilateral upper extremities.  She reports left several year history of bilateral hand numbness and tingling particular thumb index and middle finger.  Reports that it is worse at night and wakes her from her sleep.  Reports the right side is worse than the left side.  She also reports some weakness in her right hand.  She is trialed nighttime bracing with only some relief in symptoms.    She also presents with pain and loss of flexion of her right ring finger.  Reports that she sustained a crush injury at work 3 weeks ago.  Reports that she had swelling and edema of her right ring finger at the time.  Reports that she is still unable to completely flex her ring finger.  She has been immobilizing in an AlumaFoam splint.    She is otherwise healthy.  She works as a .  She denies smoking.      Subjective   Review of Systems:   Review of Systems   Constitutional: Negative.    HENT: Negative.     Eyes: Negative.    Respiratory: Negative.     Cardiovascular: Negative.    Gastrointestinal: Negative.    Endocrine: Negative.    Genitourinary: Negative.    Musculoskeletal:  Positive for arthralgias.   Skin: Negative.    Allergic/Immunologic: Negative.    Neurological: Negative.    Hematological: Negative.    Psychiatric/Behavioral: Negative.          Pertinent review of systems per HPI.     I reviewed the patient's chief complaint, history of present illness, review of systems, past medical history,  "surgical history, family history, social history, medications and allergy list in the EMR on 03/25/2024 and agree with the findings above.    Objective    Vital Signs:   Vitals:    03/25/24 0919   BP: 116/72   Weight: 109 kg (240 lb 4.8 oz)   Height: 162.6 cm (64\")     BMI: Class 3 Severe Obesity (BMI >=40). Obesity-related health conditions include the following: none. Obesity is unchanged. BMI is is above average; no BMI management plan is appropriate. We discussed portion control and increasing exercise.       General Appearance: No acute distress. Alert and oriented.     Chest:  Non-labored breathing on room air. Regular rate and rhythm.    Right Upper Extremity Exam:    No palpable masses or visible lesions  Fingers are warm, well-perfused with appropriate capillary refill.  Palpable radial pulse.    Sensation intact to light touch in median, radial and ulnar nerve distributions.    Motor- Fires FPL, ulnar intrinsics, EPL/EDC w/ full active and passive range of motion. Strength intact.    Non-tender except for in the areas highlighted below    Sensation intact to light touch in median nerve distribution  Tinel's Sign-- positive  Phalen's Test--positive   Carpal Compression Test--positive  Thenar wasting--negative  Elbow Flexion test--negative  Cubital tunnel signs--negative    Tender to palpation at the DIP of the right ring finger.  FDS is intact.  Patient does have some flexion of the DIP with isolation of the FDP however it is weak compared to the adjacent fingers.    Left Upper Extremity Exam:    No palpable masses or visible lesions  Fingers are warm, well-perfused with appropriate capillary refill.  Palpable radial pulse.    Sensation intact to light touch in median, radial and ulnar nerve distributions.    Motor- Fires FPL, ulnar intrinsics, EPL/EDC w/ full active and passive range of motion. Strength intact.    Non-tender except for in the areas highlighted below    Sensation intact to light touch in " median nerve distribution  Tinel's Sign-- positive  Phalen's Test--positive   Carpal Compression Test--positive  Thenar wasting--negative  Elbow Flexion test--negative  Cubital tunnel signs--negative    CTS-6 Questionnaire        R/L  Symptoms and History  Numbness in the median nerve territory  Y/y (3.5)   Nocturnal numbness    Y/y (4)   Physical Examination  Thenar atrophy and/or weakness   N/n (5)    Positive Phalen's test    Y/y (5)   Loss of 2-point Discrimination >5 mm  N/n (4.5)  Positive Tinel sign     Y/y (4)                Total    16.5/16.5 (26)          Imaging/Studies:   Imaging Results (Last 24 Hours)       Procedure Component Value Units Date/Time    FL < 1 Hour [[id number]] Resulted: 03/25/24 0956     Updated: 03/25/24 1007    US Guided Injection [[id number]] Resulted: 03/25/24 1001     Updated: 03/25/24 1005    Narrative:      LIMITED POINT OF CARE ULTRASOUND PROCEDURE:     INDICATION:right ring finger pain, loss of flexion  Musculoskeletal ultrasound of the right ring finger was performed and   independently interpreted today for the indication of right ring finger   pain and loss of motion.    FINDINGS: No bony avulsion fracture flexor sheath.  The FDP appears intact   throughout the flexor sheath at least to the level of the A5 pulley.      Impression:       No bony avulsion fracture from jersey finger.  FDP appears   intact at least to the level of the A5 pulley              EMG nerve conduction studies from 3/20/2024 were independently reviewed and interpreted by myself demonstrate evidence of moderate right carpal tunnel syndrome as well as mild to moderate left carpal tunnel syndrome.    Procedures:  Procedures    Quality Measures:   ACP:   ACP discussion was declined by the patient, Patient does not have an advance directive, declines further assistance.    Tobacco:   Reanta Garcia  reports that she quit smoking about 2 months ago. Her smoking use included cigarettes and pipe. She started  smoking about 5 years ago. She has a 0.5 pack-year smoking history. She has never used smokeless tobacco.      Assessment / Plan    Assessment/Plan:     There are no diagnoses linked to this encounter.     Renata Mccullough a 18 y.o. female who presents with:      ICD-10-CM ICD-9-CM   1. Carpal tunnel syndrome, bilateral  G56.03 354.0   2. Finger pain, right  M79.644 729.5         Patient presents with bilateral carpal tunnel syndrome.  I discussed the pathophysiology of carpal tunnel syndrome as well as the options for treatment including continued night bracing, corticosteroid injection or surgery.  The patient would like to proceed with nonoperative management with bilateral corticosteroid injections today.    She also presents with a work-related injury that has resulted in pain at the right ring finger as well as inability right ring finger flexion of 3 weeks duration.  Although no obvious jersey finger was identified on ultrasound she does not have an avulsion fracture on x-ray, given her exam I cannot rule out a jersey finger.  Recommend MRI stat of the right hand to rule out ring finger jersey finger.  Trevor taping of the ring finger to the small finger in the meantime.  Recommend patient follow-up after MRI to discuss the results.    Procedure Note- Carpal Tunnel Injection:    I discussed with the patient the potential benefits of performing therapeutic injections as well as potential risks including but not limited to infection, swelling, pain, bleeding, bruising, nerve/vessel damage, skin color changes, transient elevation in blood glucose levels, and fat atrophy. After informed consent and after the areas were prepped with chlorhexadine soap, ethyl chloride was used to numb the skin. The palmaris as a landmark, 20mg of Kenalog and 0.5 cc of 1% lidocaine was injected into the bilateral carpal tunnel, taking care to avoid injecting directly into the median nerve.  The patient tolerated the procedure well.  There were no complications. A sterile dressing was placed over the injection sites.      Follow Up:   Return for Follow-up after Imaging.        Chase Taylor MD  [facility] [place] [facility] and Upper Extremity Surgeon

## 2024-03-25 NOTE — PROGRESS NOTES
Procedure   - Hand/Upper Extremity Injection: bilateral carpal tunnel for carpal tunnel syndrome on 3/25/2024 9:51 AM  Indications: pain  Details: 27 G needle, volar approach  Medications (Right): 0.5 mL lidocaine PF 1% 1 %; 20 mg triamcinolone acetonide 40 MG/ML  Medications (Left): 0.5 mL lidocaine PF 1% 1 %; 20 mg triamcinolone acetonide 40 MG/ML  Outcome: tolerated well, no immediate complications  Procedure, treatment alternatives, risks and benefits explained, specific risks discussed. Consent was given by the patient. Immediately prior to procedure a time out was called to verify the correct patient, procedure, equipment, support staff and site/side marked as required. Patient was prepped and draped in the usual sterile fashion.

## 2024-03-29 ENCOUNTER — HOSPITAL ENCOUNTER (OUTPATIENT)
Dept: MRI IMAGING | Facility: HOSPITAL | Age: 19
Discharge: HOME | End: 2024-03-29
Payer: COMMERCIAL

## 2024-03-29 DIAGNOSIS — M79.644 FINGER PAIN, RIGHT: ICD-10-CM

## 2024-03-29 PROCEDURE — 73218 MRI UPPER EXTREMITY W/O DYE: CPT

## 2024-04-04 ENCOUNTER — OFFICE VISIT (OUTPATIENT)
Age: 19
End: 2024-04-04
Payer: OTHER MISCELLANEOUS

## 2024-04-04 VITALS
SYSTOLIC BLOOD PRESSURE: 120 MMHG | BODY MASS INDEX: 41.03 KG/M2 | DIASTOLIC BLOOD PRESSURE: 75 MMHG | WEIGHT: 240.3 LBS | HEIGHT: 64 IN

## 2024-04-04 DIAGNOSIS — G56.03 CARPAL TUNNEL SYNDROME, BILATERAL: ICD-10-CM

## 2024-04-04 DIAGNOSIS — M79.644 FINGER PAIN, RIGHT: Primary | ICD-10-CM

## 2024-04-04 NOTE — PROGRESS NOTES
[facility]     Follow-up Office Visit       Date: 04/04/2024   Patient Name: Renata Garcia  MRN: [id number]  YOB: 2005    Chief Complaint:   Chief Complaint   Patient presents with    Follow-up     10 day follow up; Finger pain, right-MRI completed on 3/29/24; Carpal tunnel syndrome, bilateral       History of Present Illness:   Renata Garcia is a 18 y.o. female presents for follow-up of right ring finger injury as well as bilateral carpal tunnel syndrome.  She underwent corticosteroid injection to her bilateral carpal tunnel 10 days ago.  She reports approximately 65 to 70% resolution in her carpal tunnel symptoms.  Reports that she occasionally has some tingling however it is much less severe than before.    Regarding her right ring finger, she has been compliant with the bakari straps.  Reports that she has some increased range of motion of the ring finger however is still significantly limited in flexion.  No other concerns or no new symptoms at this time.      Subjective   Review of Systems:   Review of Systems   Constitutional: Negative.    HENT: Negative.     Eyes: Negative.    Respiratory: Negative.     Cardiovascular: Negative.    Gastrointestinal: Negative.    Endocrine: Negative.    Genitourinary: Negative.    Musculoskeletal:  Positive for arthralgias.   Skin: Negative.    Allergic/Immunologic: Negative.    Neurological: Negative.    Hematological: Negative.    Psychiatric/Behavioral: Negative.          Pertinent review of systems per HPI    I reviewed the patient's chief complaint, history of present illness, review of systems, past medical history, surgical history, family history, social history, medications and allergy list in the EMR on 04/04/2024 and agree with the findings above.    Objective    Vital Signs:   Vitals:    04/04/24 0808   BP: 120/75   Weight: 109 kg (240 lb 4.8 oz)   Height: 162.6  "cm (64.02\")     BMI: Class 3 Severe Obesity (BMI >=40). Obesity-related health conditions include the following: none. Obesity is unchanged. BMI is is above average; no BMI management plan is appropriate. We discussed portion control and increasing exercise.       General Appearance: No acute distress. Alert and oriented.     Chest:  Non-labored breathing on room air      Right upper Extremity:  Right ring finger with limited flexion compared to the adjacent fingers.  There is also limited flexion of the adjacent small and middle finger.  FDS and FDP are grossly intact with motion at the PIP and the DIP.  Minimally tender to palpation over the distal phalanx.  Nontender elsewhere over the finger.  Fingers warm and well-perfused distally  Sensation intact to light touch in the median, radial and ulnar nerve distributions    Imaging/Studies:   Imaging Results (Last 24 Hours)       ** No results found for the last 24 hours. **            MRI of the right hand was independently reviewed and interpreted by myself and demonstrates intact FDS and FDP of the right ring finger.  There is mild edema surrounding the FDS above the fourth and fifth fingers.    Procedures:  Procedures    Quality Measures:   Quality Measures:   ACP:   ACP discussion was declined by the patient, Patient does not have an advance directive, declines further assistance.    Tobacco:   Renata Garcia  reports that she quit smoking about 3 months ago. Her smoking use included cigarettes and pipe. She started smoking about 5 years ago. She has a 0.5 pack-year smoking history. She has never used smokeless tobacco.    Assessment / Plan    Assessment/Plan:      Diagnosis Plan   1. Finger pain, right  Ambulatory Referral to Physical Therapy Evaluate and treat, Ortho      2. Carpal tunnel syndrome, bilateral            Patient presents for follow-up of bilateral carpal tunnel syndrome as well as right ring finger crush injury with subsequent limited " flexion.  Regarding her bilateral carpal tunnel syndrome her symptoms have significantly improved with corticosteroid injection and she continues to have approximately 70% relief.  Recommend continue nighttime bracing and observation of bilateral carpal tunnel syndrome    Regarding her right ring finger injury, the FDS and FDP are intact on MRI.  There is some edema in the FDS of both the small and ring fingers likely due to to tendon strains from her injury.  I will put in a referral for hand therapy to work on active and passive range of motion of the right hand.  Recommend patient follow-up with me in 6 weeks for range of motion check.  I will also reevaluate her carpal tunnel syndrome at that time.    Follow Up:   Return in about 6 weeks (around 5/16/2024).        Chase Taylor MD  [facility] [place] Hand and Upper Extremity Surgeon

## 2024-04-12 ENCOUNTER — TREATMENT (OUTPATIENT)
Dept: PHYSICAL THERAPY | Facility: CLINIC | Age: 19
End: 2024-04-12
Payer: OTHER MISCELLANEOUS

## 2024-04-12 DIAGNOSIS — S67.21XA HAND CRUSH INJURY, RIGHT, INITIAL ENCOUNTER: Primary | ICD-10-CM

## 2024-04-12 PROCEDURE — 97530 THERAPEUTIC ACTIVITIES: CPT | Performed by: PHYSICAL THERAPIST

## 2024-04-12 PROCEDURE — 97162 PT EVAL MOD COMPLEX 30 MIN: CPT | Performed by: PHYSICAL THERAPIST

## 2024-04-12 NOTE — PROGRESS NOTES
Physical Therapy Initial Evaluation and Plan of Care      Patient: Renata Garcia   : 2005  Diagnosis/ICD-10 Code:  Hand crush injury, right, initial encounter [S67.21XA]  Referring practitioner: Chase Taylor MD    Subjective Evaluation    History of Present Illness  Date of onset: 3/7/2024  Mechanism of injury: Works in [employer] and was holding something when a heavy object came down and hit right hand.     Still having issues with ring and small finger flexion and gripping. Still having numbness in these fingers.     Also has had issues with carpal tunnel, cortisone injections have been helpful.           Patient Occupation: [employer]  Current pain rating: 3  At worst pain ratin  Location: right ring and small fingers  Quality: dull ache and tight  Aggravating factors: movement, lifting, repetitive movement and overhead activity    Hand dominance: right    Patient Goals  Patient goals for therapy: decreased edema, decreased pain, increased motion, return to sport/leisure activities, independence with ADLs/IADLs and increased strength             Objective          Tenderness     Additional Tenderness Details  Right FDP/FSP tenderness in ring and small fingers.       Active Range of Motion     Right Digits   Flexion   Ring     MCP: 90 degrees    PIP: 50 degrees    DIP: 35 degrees  Little     MCP: 85 degrees    PIP: 90 degrees    DIP: 45 degrees    Additional Active Range of Motion Details  Composite fist 2.5 cm tip of ring finger to palm.  More painful in extensors.       Strength/Myotome Testing     Left Wrist/Hand      (2nd hand position)   Left  strength (2nd hand position) 90 lbs    Right Wrist/Hand   Wrist extension: 4-  Wrist flexion: 4-  Radial deviation: 4-  Ulnar deviation: 4-     (2nd hand position)   Right  strength (2nd hand position) 60 lbs    Additional Strength Details  Pain with resisted ring and small finger extension            Assessment & Plan       Assessment  Impairments: abnormal coordination, abnormal muscle firing, abnormal muscle tone, abnormal or restricted ROM, activity intolerance, impaired physical strength, lacks appropriate home exercise program, pain with function and weight-bearing intolerance   Functional limitations: carrying objects, lifting, sleeping, pulling, pushing, uncomfortable because of pain and unable to perform repetitive tasks   Assessment details: Patient is 18 year old female presenting with right ring and small finger pain following crush injury at work on 3/7/24. Signs and symptoms are consistent with soft tissue disturbance causing flexor and extensor tendon pain with use. No sign of tears but likely multiple sprained ligaments/tendinous structures which is common with this type of crush injury. She is have a lot of pain and dysfunction including loss of composite fist and pain with use of hand. She is appropriate for physical therapy to address this issue to return full pain free use of right hand. She does use hands a lot for heavy lifting and small dexterous manipulation for work.   Barriers to therapy: socioeconomis  Prognosis: good  Prognosis details:           SHORT TERM GOALS:  4 weeks.   1.  Pt demonstrates full composite fist.   4.  Pt demonstrates full wrist flexion while making fist compared to contralateral side.   5.  Pt will be independent with initial HEP.         LONG TERM GOALS:    12 weeks   1.  Pt demonstrates right  strength of at least 90 lbs to be able to perform heavy lifting duties at work.   2. Patient demonstrates ring and small finger extensor strength equal to contralateral side.   3. Patient is able to perform all ADLs and work tasks without compensation.         Plan  Therapy options: will be seen for skilled therapy services  Planned modality interventions: ultrasound, thermotherapy (paraffin bath), thermotherapy (hydrocollator packs), high voltage pulsed current  (pain management), cryotherapy and high voltage pulsed current (spasm management)  Planned therapy interventions: therapeutic activities, stretching, strengthening, spinal/joint mobilization, soft tissue mobilization, postural training, neuromuscular re-education, motor coordination training, orthotic fitting/training, manual therapy, joint mobilization, IADL retraining, home exercise program, functional ROM exercises, flexibility, fine motor coordination training, abdominal trunk stabilization, ADL retraining, balance/weight-bearing training and body mechanics training  Frequency: 2x week  Duration in weeks: 12  Treatment plan discussed with: patient        Manual Therapy:         mins  02490;  Therapeutic Exercise:         mins  40768;     Neuromuscular Jose Elias:        mins  95951;    Therapeutic Activity:     15     mins  17082;     Gait Training:           mins  90199;     Ultrasound:          mins  28149;    Electrical Stimulation:         mins  66924 ( );  Dry Needling         mins self-pay    Timed Treatment:   15   mins   Total Treatment:     45   mins    PT SIGNATURE: Ariana Alva PT   DATE TREATMENT INITIATED: 4/15/2024    Initial Certification  Certification Period: 7/14/2024  I certify that the therapy services are furnished while this patient is under my care.  The services outlined above are required by this patient, and will be reviewed every 90 days.     PHYSICIAN: Chase Taylor MD      DATE:     Please sign and return via fax to [phone].. Thank you, [facility].

## 2024-04-17 ENCOUNTER — TREATMENT (OUTPATIENT)
Dept: PHYSICAL THERAPY | Facility: CLINIC | Age: 19
End: 2024-04-17
Payer: OTHER MISCELLANEOUS

## 2024-04-17 DIAGNOSIS — S67.21XA HAND CRUSH INJURY, RIGHT, INITIAL ENCOUNTER: Primary | ICD-10-CM

## 2024-04-17 PROCEDURE — 97110 THERAPEUTIC EXERCISES: CPT | Performed by: PHYSICAL THERAPIST

## 2024-04-17 PROCEDURE — 97530 THERAPEUTIC ACTIVITIES: CPT | Performed by: PHYSICAL THERAPIST

## 2024-04-17 PROCEDURE — 97014 ELECTRIC STIMULATION THERAPY: CPT | Performed by: PHYSICAL THERAPIST

## 2024-04-17 NOTE — PROGRESS NOTES
Physical Therapy Daily Treatment Note         [address]              [place] [address]    Patient: Renata Garcia   : 2005  Diagnosis/ICD-10 Code:  Hand crush injury, right, initial encounter [S67.21XA]  Referring practitioner: Chase Taylor MD  Date of Initial Visit: Type: THERAPY  Noted: 2024  Today's Date: 2024  Patient seen for 2 sessions         Renata Garcia reports: can make a full fist now, still hurts but can do it. The taping was very helpful for pain.         Objective   See Exercise, Manual, and Modality Logs for complete treatment.       Assessment/Plan  Full composite fist, still has pain with this and more when flexing wrist. (+) radial nerve irritation. Worked on small object manipulation, starting resistance in multiple planes. Will continue supportive taping for work.   Progress per Plan of Care           Manual Therapy:         mins  57185;  Therapeutic Exercise:    15     mins  56749;     Neuromuscular Jose Elias:        mins  30117;    Therapeutic Activity:     23     mins  94659;     Gait Training:           mins  02369;     Ultrasound:          mins  13679;    Electrical Stimulation:    10     mins  42278 ( );  Dry Needling          mins self-pay    Timed Treatment:   38   mins   Total Treatment:     58   mins    Ariana Alva PT  Physical Therapist

## 2024-04-23 ENCOUNTER — LAB (OUTPATIENT)
Dept: INTERNAL MEDICINE | Facility: CLINIC | Age: 19
End: 2024-04-23
Payer: COMMERCIAL

## 2024-04-23 ENCOUNTER — OFFICE VISIT (OUTPATIENT)
Dept: INTERNAL MEDICINE | Facility: CLINIC | Age: 19
End: 2024-04-23
Payer: COMMERCIAL

## 2024-04-23 VITALS
HEIGHT: 64 IN | BODY MASS INDEX: 40.15 KG/M2 | DIASTOLIC BLOOD PRESSURE: 72 MMHG | HEART RATE: 82 BPM | OXYGEN SATURATION: 98 % | TEMPERATURE: 97.1 F | WEIGHT: 235.2 LBS | SYSTOLIC BLOOD PRESSURE: 118 MMHG

## 2024-04-23 DIAGNOSIS — K21.9 GASTROESOPHAGEAL REFLUX DISEASE, UNSPECIFIED WHETHER ESOPHAGITIS PRESENT: Primary | ICD-10-CM

## 2024-04-23 DIAGNOSIS — Z00.00 HEALTH CARE MAINTENANCE: ICD-10-CM

## 2024-04-23 DIAGNOSIS — Z00.00 HEALTHCARE MAINTENANCE: Primary | ICD-10-CM

## 2024-04-23 DIAGNOSIS — F60.3 BORDERLINE PERSONALITY DISORDER: ICD-10-CM

## 2024-04-23 DIAGNOSIS — E78.5 HYPERLIPIDEMIA, UNSPECIFIED HYPERLIPIDEMIA TYPE: ICD-10-CM

## 2024-04-23 DIAGNOSIS — F43.10 PTSD (POST-TRAUMATIC STRESS DISORDER): ICD-10-CM

## 2024-04-23 LAB
CHOLEST SERPL-MCNC: 223 MG/DL (ref 0–200)
HDLC SERPL-MCNC: 53 MG/DL (ref 40–60)
LDLC SERPL CALC-MCNC: 143 MG/DL (ref 0–100)
LDLC/HDLC SERPL: 2.64 {RATIO}
TRIGL SERPL-MCNC: 151 MG/DL (ref 0–150)
TSH SERPL DL<=0.05 MIU/L-ACNC: 0.9 UIU/ML (ref 0.27–4.2)
VLDLC SERPL-MCNC: 27 MG/DL (ref 5–40)

## 2024-04-23 PROCEDURE — 1160F RVW MEDS BY RX/DR IN RCRD: CPT | Performed by: PHYSICIAN ASSISTANT

## 2024-04-23 PROCEDURE — 80061 LIPID PANEL: CPT | Performed by: PHYSICIAN ASSISTANT

## 2024-04-23 PROCEDURE — 83036 HEMOGLOBIN GLYCOSYLATED A1C: CPT | Performed by: PHYSICIAN ASSISTANT

## 2024-04-23 PROCEDURE — 36415 COLL VENOUS BLD VENIPUNCTURE: CPT | Performed by: PHYSICIAN ASSISTANT

## 2024-04-23 PROCEDURE — 1159F MED LIST DOCD IN RCRD: CPT | Performed by: PHYSICIAN ASSISTANT

## 2024-04-23 PROCEDURE — 80050 GENERAL HEALTH PANEL: CPT | Performed by: PHYSICIAN ASSISTANT

## 2024-04-23 PROCEDURE — 99214 OFFICE O/P EST MOD 30 MIN: CPT | Performed by: PHYSICIAN ASSISTANT

## 2024-04-23 NOTE — PROGRESS NOTES
[facility]  [address]  [facility] [address]  Dept: [phone]  Dept Fax: [phone]  Loc: [phone]  Loc Fax: [phone]    Renata Garcia  2005    Follow Up Office Visit Note    History of Present Illness:  Patient 18-year-old female in today to follow-up for bipolar disorder, PTSD, and for acid reflux.  Worsening acid reflux.  On omeprazole but helping only a little.  Agreeable to change in medicine and GI referral at this point.    Under the care of psychiatry for bipolar and PTSD.    Patient otherwise doing well.    Hyperlipidemia  Pertinent negatives include no chest pain, myalgias or shortness of breath.       The following portions of the patient's history were reviewed and updated as appropriate: allergies, current medications, past family history, past medical history, past social history, past surgical history, and problem list.    Medications:    Current Outpatient Medications:     albuterol sulfate  (90 Base) MCG/ACT inhaler, Inhale 2 puffs Every 6 (Six) Hours As Needed for Wheezing., Disp: 6.7 g, Rfl: 0    ibuprofen (ADVIL,MOTRIN) 600 MG tablet, Take 1 tablet by mouth Every 8 (Eight) Hours As Needed for Mild Pain (for moderate severity pain)., Disp: 21 tablet, Rfl: 0    nicotine polacrilex (Nicorette) 4 MG gum, Use every 2 hours as needed, Disp: 220 each, Rfl: 1    Subjective  No Known Allergies     Past Medical History:   Diagnosis Date    Ankle sprain 2015    Anxiety 10    Asthma Baby    CTS (carpal tunnel syndrome) 03/20/2024    Depression 11    I also have borderline personality disorder and ptsd    Eating disorder 14    Anorexia    GERD (gastroesophageal reflux disease) Baby?    Migraine     Obesity Always    Ovarian cyst 2021    On an xray somewhere    Recurrent pregnancy loss, antepartum condition or complication 2022    Kathi had 3    Scoliosis 2013    Tear of meniscus of knee 07/16/2022    Wrist sprain  07/15/2021       Past Surgical History:   Procedure Laterality Date    TONSILLECTOMY  5 years okd       Family History   Problem Relation Age of Onset    Anxiety disorder Mother     Asthma Mother     Depression Mother     Mental illness Mother     Thyroid disease Mother     Depression Father     Depression Brother     Drug abuse Brother         Social History     Socioeconomic History    Marital status: Single   Tobacco Use    Smoking status: Former     Current packs/day: 0.00     Average packs/day: 0.1 packs/day for 5.0 years (0.5 ttl pk-yrs)     Types: Cigarettes, Pipe     Start date: 2019     Quit date: 2023     Years since quittin.3    Smokeless tobacco: Never    Tobacco comments:     I vape   Vaping Use    Vaping status: Some Days    Substances: Nicotine, Flavoring    Devices: Disposable   Substance and Sexual Activity    Alcohol use: Yes     Alcohol/week: 2.0 standard drinks of alcohol     Types: 2 Shots of liquor per week     Comment: I drink like once a month    Drug use: Yes     Frequency: 7.0 times per week     Types: Marijuana    Sexual activity: Yes     Partners: Male     Birth control/protection: None     Comment: My iud is out of place       Review of Systems   Constitutional:  Negative for activity change, chills, fatigue, fever and unexpected weight change.   HENT:  Negative for congestion, ear pain, postnasal drip, sinus pressure and sore throat.    Eyes:  Negative for pain, discharge and redness.   Respiratory:  Negative for cough, shortness of breath and wheezing.    Cardiovascular:  Negative for chest pain, palpitations and leg swelling.   Gastrointestinal:  Positive for nausea. Negative for diarrhea and vomiting.   Endocrine: Negative for cold intolerance and heat intolerance.   Genitourinary:  Negative for decreased urine volume and dysuria.   Musculoskeletal:  Negative for arthralgias and myalgias.   Skin:  Negative for rash and wound.   Neurological:  Negative for dizziness,  "light-headedness and headaches.   Hematological:  Does not bruise/bleed easily.   Psychiatric/Behavioral:  Negative for confusion, dysphoric mood and sleep disturbance. The patient is not nervous/anxious.          Objective  Vitals:    04/23/24 1554   BP: 118/72   BP Location: Left arm   Patient Position: Sitting   Cuff Size: Large Adult   Pulse: 82   Temp: 97.1 °F (36.2 °C)   TempSrc: Temporal   SpO2: 98%   Weight: 107 kg (235 lb 3.2 oz)   Height: 162.6 cm (64.02\")     Body mass index is 40.35 kg/m².     Physical Exam  Physical Exam  Vitals and nursing note reviewed.   Constitutional:       General: She is not in acute distress.     Appearance: She is not ill-appearing.   HENT:      Head: Normocephalic.      Right Ear: Tympanic membrane, ear canal and external ear normal. There is no impacted cerumen.      Left Ear: Tympanic membrane, ear canal and external ear normal. There is no impacted cerumen.      Nose: No congestion or rhinorrhea.      Mouth/Throat:      Mouth: Mucous membranes are moist.      Pharynx: Oropharynx is clear. No oropharyngeal exudate or posterior oropharyngeal erythema.   Eyes:      General:         Right eye: No discharge.         Left eye: No discharge.      Extraocular Movements: Extraocular movements intact.      Conjunctiva/sclera: Conjunctivae normal.      Pupils: Pupils are equal, round, and reactive to light.   Cardiovascular:      Rate and Rhythm: Normal rate and regular rhythm.      Heart sounds: Normal heart sounds. No murmur heard.     No friction rub. No gallop.   Pulmonary:      Effort: Pulmonary effort is normal. No respiratory distress.      Breath sounds: Normal breath sounds. No wheezing.   Abdominal:      General: Bowel sounds are normal. There is no distension.      Palpations: Abdomen is soft. There is no mass.      Tenderness: There is no abdominal tenderness.   Musculoskeletal:         General: No swelling. Normal range of motion.      Cervical back: Normal range of " motion. No tenderness.      Right lower leg: No edema.      Left lower leg: No edema.   Lymphadenopathy:      Cervical: No cervical adenopathy.   Skin:     Findings: No bruising, erythema or rash.   Neurological:      Mental Status: She is oriented to person, place, and time.      Gait: Gait normal.   Psychiatric:         Mood and Affect: Mood normal.         Behavior: Behavior normal.         Thought Content: Thought content normal.         Judgment: Judgment normal.         Diagnostic Data  Procedures    Assessment  Diagnoses and all orders for this visit:    1. Gastroesophageal reflux disease, unspecified whether esophagitis present (Primary)  -     Ambulatory Referral to Gastroenterology    2. Borderline personality disorder    3. PTSD (post-traumatic stress disorder)    4. Hyperlipidemia, unspecified hyperlipidemia type    5. Health care maintenance  -     CBC (No Diff)  -     Comprehensive Metabolic Panel  -     TSH Rfx On Abnormal To Free T4  -     Hemoglobin A1c; Future  -     Lipid Panel        Plan    1. Gastroesophageal reflux disease, unspecified whether esophagitis present (Primary)-worse, stop omeprazole will prescribe Protonix.  Referred to GI.    2. Borderline personality disorder- follow-up with psychiatry.    3. PTSD (post-traumatic stress disorder)- follow with psychiatry.    4. Hyperlipidemia, unspecified hyperlipidemia type- repeat FLP.    5. Health care maintenance- repeat fasting labs.      Return in about 3 months (around 7/23/2024) for Recheck.    Larry Arellano PA-C  04/23/2024

## 2024-04-24 ENCOUNTER — TREATMENT (OUTPATIENT)
Dept: PHYSICAL THERAPY | Facility: CLINIC | Age: 19
End: 2024-04-24
Payer: OTHER MISCELLANEOUS

## 2024-04-24 DIAGNOSIS — S67.21XA HAND CRUSH INJURY, RIGHT, INITIAL ENCOUNTER: Primary | ICD-10-CM

## 2024-04-24 LAB
ALBUMIN SERPL-MCNC: 4.5 G/DL (ref 3.5–5.2)
ALBUMIN/GLOB SERPL: 1.6 G/DL
ALP SERPL-CCNC: 77 U/L (ref 39–117)
ALT SERPL W P-5'-P-CCNC: 22 U/L (ref 1–33)
ANION GAP SERPL CALCULATED.3IONS-SCNC: 12.1 MMOL/L (ref 5–15)
AST SERPL-CCNC: 19 U/L (ref 1–32)
BILIRUB SERPL-MCNC: 0.5 MG/DL (ref 0–1.2)
BUN SERPL-MCNC: 12 MG/DL (ref 6–20)
BUN/CREAT SERPL: 15.6 (ref 7–25)
CALCIUM SPEC-SCNC: 9.9 MG/DL (ref 8.6–10.5)
CHLORIDE SERPL-SCNC: 101 MMOL/L (ref 98–107)
CO2 SERPL-SCNC: 24.9 MMOL/L (ref 22–29)
CREAT SERPL-MCNC: 0.77 MG/DL (ref 0.57–1)
DEPRECATED RDW RBC AUTO: 41.5 FL (ref 37–54)
EGFRCR SERPLBLD CKD-EPI 2021: 113.4 ML/MIN/1.73
ERYTHROCYTE [DISTWIDTH] IN BLOOD BY AUTOMATED COUNT: 12.6 % (ref 12.3–15.4)
GLOBULIN UR ELPH-MCNC: 2.9 GM/DL
GLUCOSE SERPL-MCNC: 47 MG/DL (ref 65–99)
HBA1C MFR BLD: 5.2 % (ref 4.8–5.6)
HCT VFR BLD AUTO: 44.6 % (ref 34–46.6)
HGB BLD-MCNC: 14.7 G/DL (ref 12–15.9)
MCH RBC QN AUTO: 30 PG (ref 26.6–33)
MCHC RBC AUTO-ENTMCNC: 33 G/DL (ref 31.5–35.7)
MCV RBC AUTO: 91 FL (ref 79–97)
PLATELET # BLD AUTO: 316 10*3/MM3 (ref 140–450)
PMV BLD AUTO: 10.5 FL (ref 6–12)
POTASSIUM SERPL-SCNC: 3.9 MMOL/L (ref 3.5–5.2)
PROT SERPL-MCNC: 7.4 G/DL (ref 6–8.5)
RBC # BLD AUTO: 4.9 10*6/MM3 (ref 3.77–5.28)
SODIUM SERPL-SCNC: 138 MMOL/L (ref 136–145)
WBC NRBC COR # BLD AUTO: 9.44 10*3/MM3 (ref 3.4–10.8)

## 2024-04-24 NOTE — PROGRESS NOTES
Physical Therapy Daily Treatment Note         [address]              [place] [address]    Patient: Renata Garcia   : 2005  Diagnosis/ICD-10 Code:  Hand crush injury, right, initial encounter [S67.21XA]  Referring practitioner: Chase Taylor MD  Date of Initial Visit: Type: THERAPY  Noted: 2024  Today's Date: 2024  Patient seen for 3 sessions       Renata Garcia reports: palm side of forearm is painful, top side improved         Objective   See Exercise, Manual, and Modality Logs for complete treatment.       Assessment/Plan  Heat and massage utilized to reduce muscle tension in forearm with positive improvement of subjective symptoms. Will continue progressive strengthening and mobility as tolerated.   Progress per Plan of Care           Manual Therapy:     10    mins  24196;  Therapeutic Exercise:    15     mins  79426;     Neuromuscular Jose Elias:        mins  90974;    Therapeutic Activity:     23     mins  73496;     Gait Training:           mins  87787;     Ultrasound:          mins  07108;    Electrical Stimulation:         mins  31325 ( );  Dry Needling          mins self-pay    Timed Treatment:   48   mins   Total Treatment:     58   mins    Ariana Alva PT  Physical Therapist

## 2024-04-24 NOTE — PROGRESS NOTES
Spoke with Pt. She said she didn't have anything to eat or drink when doing these labs. She said she felt a little woozy at the time but feels ok now. I asked if she has checked her sugar since yesterday and she said she hasn't but as soon as she gets home she will check it. I advised pt to send us a message if it is below 65. She verbalized understanding and thanked me for the call

## 2024-04-26 ENCOUNTER — TREATMENT (OUTPATIENT)
Dept: PHYSICAL THERAPY | Facility: CLINIC | Age: 19
End: 2024-04-26
Payer: OTHER MISCELLANEOUS

## 2024-04-26 DIAGNOSIS — S67.21XA HAND CRUSH INJURY, RIGHT, INITIAL ENCOUNTER: Primary | ICD-10-CM

## 2024-04-26 NOTE — PROGRESS NOTES
Physical Therapy Daily Treatment Note         [address]              [place] [address]    Patient: Renata Garcia   : 2005  Diagnosis/ICD-10 Code:  Hand crush injury, right, initial encounter [S67.21XA]  Referring practitioner: Chase Taylor MD  Date of Initial Visit: Type: THERAPY  Noted: 2024  Today's Date: 2024  Patient seen for 4 sessions         Renata Garcia reports: forearm is better but hand is more painful along the back today. 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         Objective   See Exercise, Manual, and Modality Logs for complete treatment.       Assessment/Plan  Progressing strength as able to increase tolerance for work tasks.   Progress per Plan of Care           Manual Therapy:    10     mins  68059;  Therapeutic Exercise:    15     mins  54601;     Neuromuscular Jose Elias:        mins  75293;    Therapeutic Activity:     25     mins  24603;     Gait Training:           mins  50870;     Ultrasound:          mins  15522;    Electrical Stimulation:         mins  45963 ( );  Dry Needling          mins self-pay    Timed Treatment:  50    mins   Total Treatment:     60   mins    Ariana Alva PT  Physical Therapist

## 2024-04-29 ENCOUNTER — TELEPHONE (OUTPATIENT)
Dept: ORTHOPEDIC SURGERY | Facility: CLINIC | Age: 19
End: 2024-04-29
Payer: COMMERCIAL

## 2024-04-29 NOTE — TELEPHONE ENCOUNTER
.    Caller: MISSY    Relationship: JUDITH    What form or medical record are you requesting: MRI RESULTS     Who is requesting this form or medical record from you: 2/C    How would you like to receive the form or medical records (pick-up, mail, fax): FAX  If fax, what is the fax number:   [phone]    Timeframe paperwork needed: ASAP    Additional notes: CLAIM [id number]

## 2024-05-01 ENCOUNTER — TREATMENT (OUTPATIENT)
Dept: PHYSICAL THERAPY | Facility: CLINIC | Age: 19
End: 2024-05-01
Payer: OTHER MISCELLANEOUS

## 2024-05-01 DIAGNOSIS — S67.21XA HAND CRUSH INJURY, RIGHT, INITIAL ENCOUNTER: Primary | ICD-10-CM

## 2024-05-01 NOTE — PROGRESS NOTES
Physical Therapy Daily Treatment Note         [address]              [place] [address]    Patient: Renata Garcia   : 2005  Diagnosis/ICD-10 Code:  Hand crush injury, right, initial encounter [S67.21XA]  Referring practitioner: Chase Taylor MD  Date of Initial Visit: Type: THERAPY  Noted: 2024  Today's Date: 2024  Patient seen for 5 sessions         Renata Garcia reports: overall hand is feeling stronger. Has some pain in (volar) forearm and at the base of ring finger.         Objective   Non-tender pain at 4th A1 pulley   Hypertonicity at volar forearm flexor musculature.     See Exercise, Manual, and Modality Logs for complete treatment.       Assessment/Plan  Able to progress strengthening today. Added carry, weightbearing, and reactive activities.   Progress per Plan of Care           Manual Therapy:    8     mins  29757;  Therapeutic Exercise:    30     mins  17124;     Neuromuscular Jose Elias:        mins  92088;    Therapeutic Activity:     15     mins  44685;     Gait Training:           mins  97721;     Ultrasound:          mins  21363;    Electrical Stimulation:         mins  98245 ( );  Dry Needling          mins self-pay    Timed Treatment:   53   mins   Total Treatment:     63   mins    Ariana Alva PT  Physical Therapist

## 2024-05-08 PROCEDURE — 87081 CULTURE SCREEN ONLY: CPT

## 2024-05-15 ENCOUNTER — TREATMENT (OUTPATIENT)
Dept: PHYSICAL THERAPY | Facility: CLINIC | Age: 19
End: 2024-05-15
Payer: OTHER MISCELLANEOUS

## 2024-05-15 DIAGNOSIS — S67.21XA HAND CRUSH INJURY, RIGHT, INITIAL ENCOUNTER: Primary | ICD-10-CM

## 2024-05-15 PROCEDURE — 97530 THERAPEUTIC ACTIVITIES: CPT | Performed by: PHYSICAL THERAPIST

## 2024-05-15 PROCEDURE — 97140 MANUAL THERAPY 1/> REGIONS: CPT | Performed by: PHYSICAL THERAPIST

## 2024-05-15 PROCEDURE — 97110 THERAPEUTIC EXERCISES: CPT | Performed by: PHYSICAL THERAPIST

## 2024-05-15 NOTE — PROGRESS NOTES
Physical Therapy Daily Treatment Note         [address]              [place] [address]    Patient: Renata Garcia   : 2005  Diagnosis/ICD-10 Code:  Hand crush injury, right, initial encounter [S67.21XA]  Referring practitioner: Chase Taylor MD  Date of Initial Visit: Type: THERAPY  Noted: 2024  Today's Date: 5/15/2024  Patient seen for 6 sessions         Renata Garcia reports: some carpal tunnel symptoms coming back. Endurance seems to be biggest remaining issue.         Objective     L 75 lbs   R 70 lbs     See Exercise, Manual, and Modality Logs for complete treatment.       Assessment/Plan   strength improved greatly. Discussed wrist stretches and nerve glides for carpal tunnel. Working on hand endurance.   Progress per Plan of Care           Manual Therapy:    8     mins  61921;  Therapeutic Exercise:    30     mins  89751;     Neuromuscular Jose Elias:        mins  36111;    Therapeutic Activity:     15     mins  78107;     Gait Training:           mins  27605;     Ultrasound:          mins  06164;    Electrical Stimulation:         mins  77294 ( );  Dry Needling          mins self-pay    Timed Treatment:   53   mins   Total Treatment:     53   mins    Ariana Alva PT  Physical Therapist